# Patient Record
Sex: FEMALE | Race: BLACK OR AFRICAN AMERICAN | NOT HISPANIC OR LATINO | Employment: UNEMPLOYED | URBAN - METROPOLITAN AREA
[De-identification: names, ages, dates, MRNs, and addresses within clinical notes are randomized per-mention and may not be internally consistent; named-entity substitution may affect disease eponyms.]

---

## 2019-06-27 ENCOUNTER — OFFICE VISIT (OUTPATIENT)
Dept: URGENT CARE | Facility: CLINIC | Age: 64
End: 2019-06-27
Payer: OTHER GOVERNMENT

## 2019-06-27 ENCOUNTER — HOSPITAL ENCOUNTER (OUTPATIENT)
Dept: RADIOLOGY | Facility: HOSPITAL | Age: 64
Discharge: HOME | End: 2019-06-27
Attending: NURSE PRACTITIONER
Payer: OTHER GOVERNMENT

## 2019-06-27 VITALS
TEMPERATURE: 96 F | SYSTOLIC BLOOD PRESSURE: 120 MMHG | DIASTOLIC BLOOD PRESSURE: 90 MMHG | WEIGHT: 150.38 LBS | BODY MASS INDEX: 29.36 KG/M2

## 2019-06-27 DIAGNOSIS — M79.671 RIGHT FOOT PAIN: ICD-10-CM

## 2019-06-27 DIAGNOSIS — M25.561 ACUTE PAIN OF RIGHT KNEE: ICD-10-CM

## 2019-06-27 DIAGNOSIS — M25.561 ACUTE PAIN OF RIGHT KNEE: Primary | ICD-10-CM

## 2019-06-27 PROCEDURE — 73630 XR FOOT COMPLETE 3 VIEW RIGHT: ICD-10-PCS | Mod: 26,RT,, | Performed by: RADIOLOGY

## 2019-06-27 PROCEDURE — 99203 OFFICE O/P NEW LOW 30 MIN: CPT | Mod: PBBFAC,25,PO | Performed by: NURSE PRACTITIONER

## 2019-06-27 PROCEDURE — 73560 X-RAY EXAM OF KNEE 1 OR 2: CPT | Mod: 26,59,RT, | Performed by: RADIOLOGY

## 2019-06-27 PROCEDURE — 73562 X-RAY EXAM OF KNEE 3: CPT | Mod: 26,RT,, | Performed by: RADIOLOGY

## 2019-06-27 PROCEDURE — 73562 XR KNEE ORTHO RIGHT: ICD-10-PCS | Mod: 26,RT,, | Performed by: RADIOLOGY

## 2019-06-27 PROCEDURE — 99203 OFFICE O/P NEW LOW 30 MIN: CPT | Mod: S$PBB,,, | Performed by: NURSE PRACTITIONER

## 2019-06-27 PROCEDURE — 73560 X-RAY EXAM OF KNEE 1 OR 2: CPT | Mod: TC,FY,PO,RT

## 2019-06-27 PROCEDURE — 99999 PR PBB SHADOW E&M-NEW PATIENT-LVL III: CPT | Mod: PBBFAC,,, | Performed by: NURSE PRACTITIONER

## 2019-06-27 PROCEDURE — 73630 X-RAY EXAM OF FOOT: CPT | Mod: TC,FY,PO,RT

## 2019-06-27 PROCEDURE — 73560 XR KNEE ORTHO RIGHT: ICD-10-PCS | Mod: 26,59,RT, | Performed by: RADIOLOGY

## 2019-06-27 PROCEDURE — 73562 X-RAY EXAM OF KNEE 3: CPT | Mod: TC,FY,PO,RT

## 2019-06-27 PROCEDURE — 73630 X-RAY EXAM OF FOOT: CPT | Mod: 26,RT,, | Performed by: RADIOLOGY

## 2019-06-27 PROCEDURE — 99203 PR OFFICE/OUTPT VISIT, NEW, LEVL III, 30-44 MIN: ICD-10-PCS | Mod: S$PBB,,, | Performed by: NURSE PRACTITIONER

## 2019-06-27 PROCEDURE — 99999 PR PBB SHADOW E&M-NEW PATIENT-LVL III: ICD-10-PCS | Mod: PBBFAC,,, | Performed by: NURSE PRACTITIONER

## 2019-06-27 RX ORDER — MELOXICAM 7.5 MG/1
7.5 TABLET ORAL DAILY
Qty: 30 TABLET | Refills: 0 | Status: SHIPPED | OUTPATIENT
Start: 2019-06-27 | End: 2019-07-27

## 2019-06-27 NOTE — PROGRESS NOTES
Subjective:      Patient ID: Juliane Ross is a 63 y.o. female.    Chief Complaint: Heel Pain    Knee Pain    The incident occurred 12 to 24 hours ago. The incident occurred at home. The injury mechanism is unknown. The pain is present in the right knee. The quality of the pain is described as aching. The pain has been constant since onset. Pertinent negatives include no inability to bear weight (pain with movement), loss of motion, loss of sensation, muscle weakness, numbness or tingling. The symptoms are aggravated by movement, palpation and weight bearing. She has tried ice, acetaminophen, NSAIDs, rest and elevation for the symptoms. The treatment provided no relief.   Foot Injury    The incident occurred 2 days ago. The incident occurred at home. Injury mechanism: sudden pain while running. The pain is present in the right heel. The quality of the pain is described as aching. Pertinent negatives include no inability to bear weight (pain with movement), loss of motion, loss of sensation, muscle weakness, numbness or tingling.     Review of Systems   Constitutional: Negative for chills and fever.   HENT: Negative.    Respiratory: Negative.  Negative for shortness of breath and wheezing.    Cardiovascular: Negative.    Gastrointestinal: Negative for abdominal pain, constipation, diarrhea, nausea and vomiting.   Genitourinary: Negative.    Musculoskeletal: Negative.  Negative for neck pain and neck stiffness.   Skin: Negative for rash.   Neurological: Negative.  Negative for tingling and numbness.   Psychiatric/Behavioral: Negative.        Objective:   BP (!) 120/90 (BP Location: Right arm, Patient Position: Sitting, BP Method: Small (Manual))   Temp 96 °F (35.6 °C) (Tympanic)   Wt 68.2 kg (150 lb 5.7 oz)   BMI 29.36 kg/m²   Physical Exam   Constitutional: She is oriented to person, place, and time. She appears well-developed and well-nourished. No distress.   HENT:   Head: Normocephalic and atraumatic.    Right Ear: External ear normal.   Left Ear: External ear normal.   Nose: Nose normal.   Eyes: Right eye exhibits no discharge. Left eye exhibits no discharge.   Neck: Normal range of motion. Neck supple.   Cardiovascular: Normal rate.   Pulmonary/Chest: Effort normal. No respiratory distress.   Musculoskeletal: Normal range of motion.        Right knee: She exhibits swelling and bony tenderness (patella). She exhibits normal range of motion, no ecchymosis, no erythema, no LCL laxity and no MCL laxity. Tenderness found. Patellar tendon tenderness noted.        Right foot: There is tenderness and bony tenderness (midfoot and heel). There is normal range of motion, no swelling, normal capillary refill and no deformity.   Neurological: She is alert and oriented to person, place, and time.   Skin: Skin is warm and dry. No rash noted. She is not diaphoretic.   Psychiatric: She has a normal mood and affect. Her behavior is normal. Judgment and thought content normal.   Nursing note and vitals reviewed.    Assessment:      1. Acute pain of right knee    2. Right foot pain       Plan:   Acute pain of right knee  -     X-ray Knee Ortho Right; Future; Expected date: 06/27/2019  -     meloxicam (MOBIC) 7.5 MG tablet; Take 1 tablet (7.5 mg total) by mouth once daily.  Dispense: 30 tablet; Refill: 0  -     POCT Apply ace wrap    Right foot pain  -     X-Ray Foot Complete Right; Future; Expected date: 06/27/2019  -     meloxicam (MOBIC) 7.5 MG tablet; Take 1 tablet (7.5 mg total) by mouth once daily.  Dispense: 30 tablet; Refill: 0  -     POCT Apply ace wrap    RICE  Ortho if not improving.  Instructions, follow up, and supportive care as per AVS.

## 2021-04-28 ENCOUNTER — PATIENT MESSAGE (OUTPATIENT)
Dept: RESEARCH | Facility: HOSPITAL | Age: 66
End: 2021-04-28

## 2023-01-05 ENCOUNTER — PATIENT MESSAGE (OUTPATIENT)
Dept: RESEARCH | Facility: HOSPITAL | Age: 68
End: 2023-01-05
Payer: MEDICARE

## 2023-06-06 ENCOUNTER — TELEPHONE (OUTPATIENT)
Dept: INTERNAL MEDICINE | Facility: CLINIC | Age: 68
End: 2023-06-06
Payer: MEDICARE

## 2023-06-06 NOTE — TELEPHONE ENCOUNTER
----- Message from Maggi Stephen sent at 6/6/2023  3:12 PM CDT -----  Regarding: Appt  Contact: [phone]  Patient Juliane is calling. Patient would like to schedule an appt with . Please call patient at [phone]

## 2023-06-29 ENCOUNTER — OFFICE VISIT (OUTPATIENT)
Dept: INTERNAL MEDICINE | Facility: CLINIC | Age: 68
End: 2023-06-29
Payer: MEDICARE

## 2023-06-29 ENCOUNTER — HOSPITAL ENCOUNTER (OUTPATIENT)
Dept: CARDIOLOGY | Facility: HOSPITAL | Age: 68
Discharge: HOME | End: 2023-06-29
Payer: MEDICARE

## 2023-06-29 VITALS
DIASTOLIC BLOOD PRESSURE: 96 MMHG | TEMPERATURE: 98 F | HEIGHT: 60 IN | WEIGHT: 161.63 LBS | BODY MASS INDEX: 31.73 KG/M2 | SYSTOLIC BLOOD PRESSURE: 160 MMHG | HEART RATE: 84 BPM

## 2023-06-29 DIAGNOSIS — E78.5 HYPERLIPIDEMIA, UNSPECIFIED HYPERLIPIDEMIA TYPE: ICD-10-CM

## 2023-06-29 DIAGNOSIS — R06.02 SOB (SHORTNESS OF BREATH): ICD-10-CM

## 2023-06-29 DIAGNOSIS — H26.9 CATARACT, UNSPECIFIED CATARACT TYPE, UNSPECIFIED LATERALITY: ICD-10-CM

## 2023-06-29 DIAGNOSIS — R73.09 ABNORMAL GLUCOSE: ICD-10-CM

## 2023-06-29 DIAGNOSIS — I34.1 MVP (MITRAL VALVE PROLAPSE): ICD-10-CM

## 2023-06-29 DIAGNOSIS — Z01.818 PREOP EXAMINATION: ICD-10-CM

## 2023-06-29 DIAGNOSIS — I10 HYPERTENSION, UNSPECIFIED TYPE: Primary | ICD-10-CM

## 2023-06-29 PROCEDURE — 99999 PR PBB SHADOW E&M-EST. PATIENT-LVL IV: CPT | Mod: PBBFAC,,, | Performed by: FAMILY MEDICINE

## 2023-06-29 PROCEDURE — 99999 PR PBB SHADOW E&M-EST. PATIENT-LVL IV: ICD-10-PCS | Mod: PBBFAC,,, | Performed by: FAMILY MEDICINE

## 2023-06-29 PROCEDURE — 93005 ELECTROCARDIOGRAM TRACING: CPT | Mod: PO

## 2023-06-29 PROCEDURE — 99204 PR OFFICE/OUTPT VISIT, NEW, LEVL IV, 45-59 MIN: ICD-10-PCS | Mod: S$PBB,,, | Performed by: FAMILY MEDICINE

## 2023-06-29 PROCEDURE — 99214 OFFICE O/P EST MOD 30 MIN: CPT | Mod: PBBFAC,PO | Performed by: FAMILY MEDICINE

## 2023-06-29 PROCEDURE — 93010 ELECTROCARDIOGRAM REPORT: CPT | Mod: ,,, | Performed by: INTERNAL MEDICINE

## 2023-06-29 PROCEDURE — 99204 OFFICE O/P NEW MOD 45 MIN: CPT | Mod: S$PBB,,, | Performed by: FAMILY MEDICINE

## 2023-06-29 PROCEDURE — 93010 EKG 12-LEAD: ICD-10-PCS | Mod: ,,, | Performed by: INTERNAL MEDICINE

## 2023-06-29 RX ORDER — VIT C/E/ZN/COPPR/LUTEIN/ZEAXAN 250MG-90MG
1000 CAPSULE ORAL DAILY
COMMUNITY

## 2023-06-29 RX ORDER — LOSARTAN POTASSIUM 50 MG/1
50 TABLET ORAL DAILY
Qty: 30 TABLET | Refills: 1 | Status: SHIPPED | OUTPATIENT
Start: 2023-06-29 | End: 2023-08-16 | Stop reason: SDUPTHER

## 2023-06-29 RX ORDER — PHENYLEPHRINE HCL 10 MG
500 TABLET ORAL DAILY
COMMUNITY

## 2023-06-29 RX ORDER — ASCORBIC ACID 500 MG
500 TABLET ORAL DAILY
COMMUNITY

## 2023-06-29 NOTE — PROGRESS NOTES
Subjective:      Patient ID: Juliane Ross is a 67 y.o. female.    Chief Complaint: Establish Care and Pre-op Exam    HPI  68 yo pleasant female here to establish care.  Not seen in many yrs.  Having preop for cataracts,  and .  BP elevated off/on.  Trying to eat better.  Was heavier//feeling more SOB and so started moving more.  Feeling better.  Denies current SOB/CP.  Normal BMs    Past Medical History:   Diagnosis Date    Fatigue     Heart murmur     Hyperlipidemia     Sleep apnea      Family History   Problem Relation Age of Onset    Cancer Mother     Hypertension Sister     Arthritis Sister     Arthritis Sister     Asthma Daughter     Diabetes Paternal Aunt     Cancer Paternal Grandmother     Diabetes Paternal Grandfather      Past Surgical History:   Procedure Laterality Date     SECTION      MYOMECTOMY       Social History     Tobacco Use    Smoking status: Former     Packs/day: 1.00     Types: Cigarettes     Quit date: 1988     Years since quittin.0     Passive exposure: Current   Substance Use Topics    Alcohol use: No    Drug use: No       BP (!) 160/96   Pulse 84   Temp 97.9 °F (36.6 °C) (Tympanic)   Ht 5' (1.524 m)   Wt 73.3 kg (161 lb 9.6 oz)   BMI 31.56 kg/m²     Review of Systems   Constitutional:  Positive for activity change. Negative for appetite change, chills, diaphoresis, fatigue, fever and unexpected weight change.   HENT:  Negative for ear pain, hearing loss, postnasal drip, rhinorrhea and tinnitus.    Eyes:  Positive for visual disturbance.   Respiratory:  Negative for cough, shortness of breath and wheezing.    Cardiovascular:  Negative for chest pain, palpitations and leg swelling.   Gastrointestinal:  Negative for abdominal distention.   Genitourinary:  Negative for dysuria, frequency, hematuria and urgency.   Musculoskeletal:  Negative for back pain and joint swelling.   Neurological:  Negative for weakness and headaches.   Hematological:  Negative  for adenopathy.   Psychiatric/Behavioral:  Negative for confusion and decreased concentration.      Objective:     Physical Exam  Vitals and nursing note reviewed.   Constitutional:       General: She is not in acute distress.     Appearance: She is well-developed.   HENT:      Right Ear: External ear normal.      Left Ear: External ear normal.      Nose: Nose normal.   Eyes:      Conjunctiva/sclera: Conjunctivae normal.      Pupils: Pupils are equal, round, and reactive to light.   Neck:      Thyroid: No thyromegaly.   Cardiovascular:      Rate and Rhythm: Normal rate and regular rhythm.      Heart sounds: Murmur heard.   Pulmonary:      Effort: Pulmonary effort is normal. No respiratory distress.      Breath sounds: Normal breath sounds. No wheezing or rales.   Abdominal:      General: Bowel sounds are normal. There is no distension.      Palpations: Abdomen is soft.      Tenderness: There is no abdominal tenderness. There is no guarding.   Musculoskeletal:         General: Normal range of motion.      Cervical back: Normal range of motion and neck supple.   Skin:     General: Skin is warm and dry.      Findings: No rash.   Neurological:      Mental Status: She is alert and oriented to person, place, and time.      Cranial Nerves: No cranial nerve deficit.   Psychiatric:         Behavior: Behavior normal.         Thought Content: Thought content normal.         Judgment: Judgment normal.       Lab Results   Component Value Date    WBC 4.60 11/14/2013    HGB 13.1 11/14/2013    HCT 39.8 11/14/2013     11/14/2013    CHOL 269 (H) 11/14/2013    TRIG 56 11/14/2013    HDL 87 (H) 11/14/2013    ALT 28 11/14/2013    AST 22 11/14/2013     11/14/2013    K 3.9 11/14/2013     11/14/2013    CREATININE 0.9 11/14/2013    BUN 11 11/14/2013    CO2 26 11/14/2013    TSH 1.302 11/14/2013    HGBA1C 6.1 11/14/2013       Assessment:     1. Hypertension, unspecified type    2. Preop examination    3. Cataract,  unspecified cataract type, unspecified laterality    4. MVP (mitral valve prolapse)    5. SOB (shortness of breath)    6. Abnormal glucose    7. Hyperlipidemia, unspecified hyperlipidemia type         Plan:     Hypertension, unspecified type  -     EKG 12-lead  -     CBC Auto Differential; Future; Expected date: 07/29/2023  -     Comprehensive Metabolic Panel; Future; Expected date: 07/29/2023    Preop examination    Cataract, unspecified cataract type, unspecified laterality    MVP (mitral valve prolapse)  -     EKG 12-lead    SOB (shortness of breath)  -     EKG 12-lead    Abnormal glucose  -     Hemoglobin A1C; Future; Expected date: 07/29/2023    Hyperlipidemia, unspecified hyperlipidemia type  -     Lipid Panel; Future; Expected date: 07/29/2023  -     TSH; Future; Expected date: 07/29/2023    Other orders  -     losartan (COZAAR) 50 MG tablet; Take 1 tablet (50 mg total) by mouth once daily.  Dispense: 30 tablet; Refill: 1      BP high  Start losartan 50mg  Low sodium diet  Preop forms filled out//will fax and pt can take a copy  Hx of mild DOMINIC//not treated.  Consider as BP getting controlled  Known MVP  Update labs/EKG and fu 3-4 wks

## 2023-07-24 ENCOUNTER — PATIENT MESSAGE (OUTPATIENT)
Dept: RESEARCH | Facility: HOSPITAL | Age: 68
End: 2023-07-24
Payer: MEDICARE

## 2023-07-31 ENCOUNTER — LAB VISIT (OUTPATIENT)
Dept: LAB | Facility: HOSPITAL | Age: 68
End: 2023-07-31
Attending: FAMILY MEDICINE
Payer: MEDICARE

## 2023-07-31 ENCOUNTER — PATIENT MESSAGE (OUTPATIENT)
Dept: RESEARCH | Facility: HOSPITAL | Age: 68
End: 2023-07-31
Payer: MEDICARE

## 2023-07-31 DIAGNOSIS — R73.09 ABNORMAL GLUCOSE: ICD-10-CM

## 2023-07-31 DIAGNOSIS — I10 HYPERTENSION, UNSPECIFIED TYPE: ICD-10-CM

## 2023-07-31 DIAGNOSIS — E78.5 HYPERLIPIDEMIA, UNSPECIFIED HYPERLIPIDEMIA TYPE: ICD-10-CM

## 2023-07-31 LAB
ALBUMIN SERPL BCP-MCNC: 4.1 G/DL (ref 3.5–5.2)
ALP SERPL-CCNC: 73 U/L (ref 55–135)
ALT SERPL W/O P-5'-P-CCNC: 17 U/L (ref 10–44)
ANION GAP SERPL CALC-SCNC: 14 MMOL/L (ref 8–16)
AST SERPL-CCNC: 20 U/L (ref 10–40)
BASOPHILS # BLD AUTO: 0.02 K/UL (ref 0–0.2)
BASOPHILS NFR BLD: 0.4 % (ref 0–1.9)
BILIRUB SERPL-MCNC: 0.5 MG/DL (ref 0.1–1)
BUN SERPL-MCNC: 10 MG/DL (ref 8–23)
CALCIUM SERPL-MCNC: 9.8 MG/DL (ref 8.7–10.5)
CHLORIDE SERPL-SCNC: 105 MMOL/L (ref 95–110)
CHOLEST SERPL-MCNC: 267 MG/DL (ref 120–199)
CHOLEST/HDLC SERPL: 3.1 {RATIO} (ref 2–5)
CO2 SERPL-SCNC: 23 MMOL/L (ref 23–29)
CREAT SERPL-MCNC: 0.7 MG/DL (ref 0.5–1.4)
DIFFERENTIAL METHOD: ABNORMAL
EOSINOPHIL # BLD AUTO: 0.1 K/UL (ref 0–0.5)
EOSINOPHIL NFR BLD: 1.9 % (ref 0–8)
ERYTHROCYTE [DISTWIDTH] IN BLOOD BY AUTOMATED COUNT: 12.7 % (ref 11.5–14.5)
EST. GFR  (NO RACE VARIABLE): >60 ML/MIN/1.73 M^2
ESTIMATED AVG GLUCOSE: 120 MG/DL (ref 68–131)
GLUCOSE SERPL-MCNC: 85 MG/DL (ref 70–110)
HBA1C MFR BLD: 5.8 % (ref 4–5.6)
HCT VFR BLD AUTO: 41.2 % (ref 37–48.5)
HDLC SERPL-MCNC: 86 MG/DL (ref 40–75)
HDLC SERPL: 32.2 % (ref 20–50)
HGB BLD-MCNC: 13.4 G/DL (ref 12–16)
IMM GRANULOCYTES # BLD AUTO: 0.01 K/UL (ref 0–0.04)
IMM GRANULOCYTES NFR BLD AUTO: 0.2 % (ref 0–0.5)
LDLC SERPL CALC-MCNC: 166.4 MG/DL (ref 63–159)
LYMPHOCYTES # BLD AUTO: 2.8 K/UL (ref 1–4.8)
LYMPHOCYTES NFR BLD: 60.8 % (ref 18–48)
MCH RBC QN AUTO: 30.4 PG (ref 27–31)
MCHC RBC AUTO-ENTMCNC: 32.5 G/DL (ref 32–36)
MCV RBC AUTO: 93 FL (ref 82–98)
MONOCYTES # BLD AUTO: 0.3 K/UL (ref 0.3–1)
MONOCYTES NFR BLD: 7.1 % (ref 4–15)
NEUTROPHILS # BLD AUTO: 1.4 K/UL (ref 1.8–7.7)
NEUTROPHILS NFR BLD: 29.6 % (ref 38–73)
NONHDLC SERPL-MCNC: 181 MG/DL
NRBC BLD-RTO: 0 /100 WBC
PLATELET # BLD AUTO: 280 K/UL (ref 150–450)
PMV BLD AUTO: 9.6 FL (ref 9.2–12.9)
POTASSIUM SERPL-SCNC: 4.1 MMOL/L (ref 3.5–5.1)
PROT SERPL-MCNC: 7.7 G/DL (ref 6–8.4)
RBC # BLD AUTO: 4.41 M/UL (ref 4–5.4)
SODIUM SERPL-SCNC: 142 MMOL/L (ref 136–145)
TRIGL SERPL-MCNC: 73 MG/DL (ref 30–150)
TSH SERPL DL<=0.005 MIU/L-ACNC: 1.7 UIU/ML (ref 0.4–4)
WBC # BLD AUTO: 4.67 K/UL (ref 3.9–12.7)

## 2023-07-31 PROCEDURE — 84443 ASSAY THYROID STIM HORMONE: CPT | Performed by: FAMILY MEDICINE

## 2023-07-31 PROCEDURE — 83036 HEMOGLOBIN GLYCOSYLATED A1C: CPT | Performed by: FAMILY MEDICINE

## 2023-07-31 PROCEDURE — 85025 COMPLETE CBC W/AUTO DIFF WBC: CPT | Performed by: FAMILY MEDICINE

## 2023-07-31 PROCEDURE — 80061 LIPID PANEL: CPT | Performed by: FAMILY MEDICINE

## 2023-07-31 PROCEDURE — 36415 COLL VENOUS BLD VENIPUNCTURE: CPT | Mod: PO | Performed by: FAMILY MEDICINE

## 2023-07-31 PROCEDURE — 80053 COMPREHEN METABOLIC PANEL: CPT | Performed by: FAMILY MEDICINE

## 2023-08-16 RX ORDER — LOSARTAN POTASSIUM 50 MG/1
50 TABLET ORAL DAILY
Qty: 30 TABLET | Refills: 6 | Status: SHIPPED | OUTPATIENT
Start: 2023-08-16 | End: 2024-08-15

## 2023-08-16 NOTE — TELEPHONE ENCOUNTER
No care due was identified.  [facility] Embedded Care Due Messages. Reference number: [id number].   8/16/2023 9:55:16 AM CDT

## 2025-03-28 ENCOUNTER — HOSPITAL ENCOUNTER (OUTPATIENT)
Dept: RADIOLOGY | Facility: CLINIC | Age: 70
Discharge: HOME | End: 2025-03-28
Attending: PHYSICIAN ASSISTANT
Payer: MEDICARE

## 2025-03-28 ENCOUNTER — OFFICE VISIT (OUTPATIENT)
Dept: URGENT CARE | Facility: CLINIC | Age: 70
End: 2025-03-28
Payer: MEDICARE

## 2025-03-28 VITALS
OXYGEN SATURATION: 100 % | SYSTOLIC BLOOD PRESSURE: 189 MMHG | HEART RATE: 95 BPM | WEIGHT: 167.13 LBS | DIASTOLIC BLOOD PRESSURE: 89 MMHG | RESPIRATION RATE: 16 BRPM | TEMPERATURE: 99 F | BODY MASS INDEX: 29.61 KG/M2 | HEIGHT: 63 IN

## 2025-03-28 DIAGNOSIS — R53.83 FATIGUE, UNSPECIFIED TYPE: ICD-10-CM

## 2025-03-28 DIAGNOSIS — Z91.199 NON-COMPLIANT PATIENT: ICD-10-CM

## 2025-03-28 DIAGNOSIS — R05.1 ACUTE COUGH: ICD-10-CM

## 2025-03-28 DIAGNOSIS — I10 ELEVATED BLOOD PRESSURE READING IN OFFICE WITH DIAGNOSIS OF HYPERTENSION: ICD-10-CM

## 2025-03-28 DIAGNOSIS — R05.8 POST-VIRAL COUGH SYNDROME: ICD-10-CM

## 2025-03-28 DIAGNOSIS — Z86.16 HISTORY OF COVID-19: ICD-10-CM

## 2025-03-28 DIAGNOSIS — Z86.16 HISTORY OF COVID-19: Primary | ICD-10-CM

## 2025-03-28 RX ORDER — BENZONATATE 200 MG/1
200 CAPSULE ORAL 3 TIMES DAILY PRN
Qty: 30 CAPSULE | Refills: 0 | Status: SHIPPED | OUTPATIENT
Start: 2025-03-28 | End: 2025-04-07

## 2025-03-28 RX ORDER — FLUTICASONE PROPIONATE 50 MCG
1 SPRAY, SUSPENSION (ML) NASAL DAILY
Qty: 16 ML | Refills: 0 | Status: SHIPPED | OUTPATIENT
Start: 2025-03-28

## 2025-03-28 RX ORDER — PROMETHAZINE HYDROCHLORIDE AND DEXTROMETHORPHAN HYDROBROMIDE 6.25; 15 MG/5ML; MG/5ML
5 SYRUP ORAL EVERY 6 HOURS PRN
Qty: 120 ML | Refills: 0 | Status: SHIPPED | OUTPATIENT
Start: 2025-03-28

## 2025-03-28 NOTE — PROGRESS NOTES
"Subjective:      Patient ID: Juliane Ross is a 69 y.o. female.    Vitals:  height is 5' 3" (1.6 m) and weight is 75.8 kg (167 lb 1.7 oz). Her oral temperature is 98.9 °F (37.2 °C). Her blood pressure is 189/89 (abnormal) and her pulse is 95. Her respiration is 16 and oxygen saturation is 100%.     Chief Complaint: Cough    This is a 69 y.o. female who presents today with a chief complaint of productive cough (chest congestion), fatigue, nasal congestion, scratchy throat ("from coughing") and headache (frontal) x 1.5 weeks after testing positive for COVID at home.  Reports family members also had covid but pt feels her symptoms have not improved as much as her family members. She denies fever, chest pain, sob, ear pain, ear congestion, nausea, vomiting, diarrhea. Reports rattling feeling she describes as wheeze occasionally. No hx of asthma or lung disease. Cough seems worse when laying down, talking, or deep breaths.     Home tx: mucinex (last dose: couple of days ago), robitussin (last dose: yesterday), cough drops: reports mild relief.     [facility]: prolactinoma, mitral valve prolapse, high bp      Cough  This is a new problem. The current episode started 1 to 4 weeks ago. The problem has been gradually improving. The cough is Productive of sputum. Associated symptoms include headaches, nasal congestion, postnasal drip, a sore throat, sweats and wheezing. Pertinent negatives include no chest pain, chills, ear congestion, ear pain, fever, heartburn, hemoptysis, myalgias, rash, shortness of breath or weight loss. She has tried OTC cough suppressant for the symptoms. The treatment provided mild relief. There is no history of asthma, bronchiectasis, bronchitis, COPD, emphysema, environmental allergies or pneumonia.       Constitution: Positive for fatigue. Negative for chills and fever.   HENT:  Positive for congestion, postnasal drip and sore throat. Negative for ear pain.    Neck: neck negative. "   Cardiovascular:  Negative for chest pain, leg swelling, palpitations and sob on exertion.   Respiratory:  Positive for cough, sputum production and wheezing. Negative for bloody sputum, shortness of breath and asthma.    Gastrointestinal:  Negative for abdominal pain, nausea, vomiting and heartburn.   Musculoskeletal:  Negative for muscle ache.   Skin:  Negative for rash.   Allergic/Immunologic: Negative for environmental allergies and asthma.   Neurological:  Positive for headaches. Negative for dizziness, passing out, numbness and tingling.      Objective:     Physical Exam   Constitutional: She is oriented to person, place, and time. She appears well-developed.  Non-toxic appearance. She does not appear ill. No distress. overweight  HENT:   Head: Normocephalic and atraumatic.   Ears:   Right Ear: External ear normal. impacted cerumen  Left Ear: External ear normal. impacted cerumen  Nose: Nose normal.   Mouth/Throat: Mucous membranes are moist. Oropharynx is clear.   Eyes: Conjunctivae and EOM are normal.   Neck: Neck supple.   Cardiovascular: Normal rate, regular rhythm and normal heart sounds.   Pulmonary/Chest: Effort normal and breath sounds normal. No respiratory distress. She has no wheezes.   Abdominal: Normal appearance.   Musculoskeletal: Normal range of motion.         General: Normal range of motion.   Lymphadenopathy:     She has no cervical adenopathy.   Neurological: no focal deficit. She is alert and oriented to person, place, and time. She displays no weakness. No cranial nerve deficit. Gait normal.   Skin: Skin is warm, dry, not diaphoretic, not pale and no rash.   Psychiatric: Her behavior is normal.     XR CHEST PA AND LATERAL  Result Date: 3/28/2025  EXAM: XR CHEST PA AND LATERAL CLINICAL HISTORY:  Partial history of COVID FINDINGS: The heart size is normal. The lung fields are clear.      No acute findings. Finalized on: 3/28/2025 1:40 PM By:  Jonah Moncada MD [facility]# [id number]      2025-03-28  13:42:[id number]     [facility]      Assessment:     1. History of COVID-19    2. Elevated blood pressure reading in office with diagnosis of hypertension    3. Acute cough    4. Fatigue, unspecified type    5. Non-compliant patient    6. Post-viral cough syndrome        Plan:       History of COVID-19  -     XR CHEST PA AND LATERAL; Future; Expected date: 03/28/2025  -     fluticasone propionate (FLONASE) 50 mcg/actuation nasal spray; 1 spray (50 mcg total) by Each Nostril route once daily.  Dispense: 16 mL; Refill: 0    Elevated blood pressure reading in office with diagnosis of hypertension  -     Ambulatory referral/consult to Internal Medicine    Acute cough  -     XR CHEST PA AND LATERAL; Future; Expected date: 03/28/2025  -     benzonatate (TESSALON) 200 MG capsule; Take 1 capsule (200 mg total) by mouth 3 (three) times daily as needed for Cough.  Dispense: 30 capsule; Refill: 0  -     promethazine-dextromethorphan (PROMETHAZINE-DM) 6.25-15 mg/5 mL Syrp; Take 5 mLs by mouth every 6 (six) hours as needed (cough). May cause drowsiness.  Dispense: 120 mL; Refill: 0    Fatigue, unspecified type  -     XR CHEST PA AND LATERAL; Future; Expected date: 03/28/2025    Non-compliant patient  -     Ambulatory referral/consult to Internal Medicine    Post-viral cough syndrome  -     benzonatate (TESSALON) 200 MG capsule; Take 1 capsule (200 mg total) by mouth 3 (three) times daily as needed for Cough.  Dispense: 30 capsule; Refill: 0  -     promethazine-dextromethorphan (PROMETHAZINE-DM) 6.25-15 mg/5 mL Syrp; Take 5 mLs by mouth every 6 (six) hours as needed (cough). May cause drowsiness.  Dispense: 120 mL; Refill: 0  -     fluticasone propionate (FLONASE) 50 mcg/actuation nasal spray; 1 spray (50 mcg total) by Each Nostril route once daily.  Dispense: 16 mL; Refill: 0          Medical Decision Making:   History:   Old Medical Records: I decided to obtain old medical records.       <> Summary of Records: BP markedly elevated upon  arrival.  Per chart review, last appointment with PCP was in 2023 where she establish care and was prescribed losartan.  Patient states that she has not followed up since that appointment and currently does not take any medications for blood pressure or cholesterol.  Differential Diagnosis:   Post viral cough, bronchitis, reactive airway disease, pneumonia  Clinical Tests:   Radiological Study: Ordered and Reviewed  Urgent Care Management:  Chest x-ray with no acute findings.  Lungs are CTA and oxygen 100%.  No evidence of pneumonia or bacterial infection.  Patient was advised to schedule follow-up with PCP to reestablish care and have hypertension and hyperlipidemia evaluated and treated. DASH diet.  Cough medications as prescribed.  Daily Flonase.  Can continue OTC expectorant.  Rest and drink plenty of fluids.  Close follow-up with PCP as discussed, especially if symptoms worsen or fail to improve with treatment.

## 2025-04-11 ENCOUNTER — LAB VISIT (OUTPATIENT)
Dept: LAB | Facility: HOSPITAL | Age: 70
End: 2025-04-11
Payer: MEDICARE

## 2025-04-11 ENCOUNTER — OFFICE VISIT (OUTPATIENT)
Dept: INTERNAL MEDICINE | Facility: CLINIC | Age: 70
End: 2025-04-11
Payer: MEDICARE

## 2025-04-11 VITALS
SYSTOLIC BLOOD PRESSURE: 188 MMHG | WEIGHT: 168 LBS | RESPIRATION RATE: 16 BRPM | HEART RATE: 89 BPM | OXYGEN SATURATION: 99 % | TEMPERATURE: 97 F | DIASTOLIC BLOOD PRESSURE: 88 MMHG | BODY MASS INDEX: 29.77 KG/M2 | HEIGHT: 63 IN

## 2025-04-11 DIAGNOSIS — Z78.0 POSTMENOPAUSAL: ICD-10-CM

## 2025-04-11 DIAGNOSIS — E78.5 HYPERLIPIDEMIA, UNSPECIFIED HYPERLIPIDEMIA TYPE: ICD-10-CM

## 2025-04-11 DIAGNOSIS — Z12.11 COLON CANCER SCREENING: ICD-10-CM

## 2025-04-11 DIAGNOSIS — Z12.31 ENCOUNTER FOR SCREENING MAMMOGRAM FOR MALIGNANT NEOPLASM OF BREAST: ICD-10-CM

## 2025-04-11 DIAGNOSIS — Z13.820 OSTEOPOROSIS SCREENING: ICD-10-CM

## 2025-04-11 DIAGNOSIS — G47.9 TROUBLE IN SLEEPING: ICD-10-CM

## 2025-04-11 DIAGNOSIS — I10 HYPERTENSION, UNSPECIFIED TYPE: Primary | ICD-10-CM

## 2025-04-11 DIAGNOSIS — R73.09 ABNORMAL GLUCOSE: ICD-10-CM

## 2025-04-11 DIAGNOSIS — L30.9 DERMATITIS OF EXTERNAL EAR: ICD-10-CM

## 2025-04-11 DIAGNOSIS — I10 HYPERTENSION, UNSPECIFIED TYPE: ICD-10-CM

## 2025-04-11 PROCEDURE — 99215 OFFICE O/P EST HI 40 MIN: CPT | Mod: PBBFAC,PO

## 2025-04-11 PROCEDURE — 80061 LIPID PANEL: CPT

## 2025-04-11 PROCEDURE — 83036 HEMOGLOBIN GLYCOSYLATED A1C: CPT

## 2025-04-11 PROCEDURE — 84443 ASSAY THYROID STIM HORMONE: CPT

## 2025-04-11 PROCEDURE — 99999 PR PBB SHADOW E&M-EST. PATIENT-LVL V: CPT | Mod: PBBFAC,,,

## 2025-04-11 PROCEDURE — 80053 COMPREHEN METABOLIC PANEL: CPT

## 2025-04-11 PROCEDURE — 36415 COLL VENOUS BLD VENIPUNCTURE: CPT | Mod: PO

## 2025-04-11 PROCEDURE — 85025 COMPLETE CBC W/AUTO DIFF WBC: CPT

## 2025-04-11 RX ORDER — LOSARTAN POTASSIUM 50 MG/1
50 TABLET ORAL DAILY
Qty: 90 TABLET | Refills: 0 | Status: SHIPPED | OUTPATIENT
Start: 2025-04-11 | End: 2025-07-10

## 2025-04-11 RX ORDER — LOSARTAN POTASSIUM 50 MG/1
50 TABLET ORAL DAILY
COMMUNITY
End: 2025-04-11 | Stop reason: SDUPTHER

## 2025-04-11 RX ORDER — TRAZODONE HYDROCHLORIDE 50 MG/1
50 TABLET ORAL NIGHTLY
Qty: 30 TABLET | Refills: 11 | Status: SHIPPED | OUTPATIENT
Start: 2025-04-11 | End: 2026-04-11

## 2025-04-11 RX ORDER — TRIAMCINOLONE ACETONIDE 1 MG/G
OINTMENT TOPICAL DAILY PRN
Qty: 15 G | Refills: 0 | Status: SHIPPED | OUTPATIENT
Start: 2025-04-11 | End: 2025-05-11

## 2025-04-11 NOTE — PROGRESS NOTES
Subjective:       Patient ID: Juliane Ross is a 69 y.o. female.    Chief Complaint: Annual Exam (+ Hypertension)    69-year-old female presents for annual exam and to establish care with new provider. Previously managed by Dr. Benavidez.    Postmenopausal                                                                                                                                                                       Not currently sexually active, no concerns for STIs                                                                                                      Last mammogram 2013-will order today                                                                                                                                                    Denies breast lumps, bumps, nipple discharge, change in contour                                                                               Last colon cancer screening 3/2013, will order today                                                                                                                       Last DXA never-will order today  Denies increased urinary urgency, endorses frequency                                                                                                                                        Denies diarrhea, constipation                                                                                                                                                                                Last eye exam within the last year, wears reading glasses  Last dental exam over 6mo ago, no dental disease, brushes teeth BID  Wears her seatbelt in the car  Tries to eat a well balanced diet  Does not sleep well most nights-trouble staying asleep  Exercises on stationary bike 5-6D/wk, intermittent light weights                                                                                                                                                  Retired   Denies chest pain, palpitations, dyspnea, edema, changes in vision, flashes, floaters, tinnitus, myalgia, joint pain, numbness and tingling, weakness, syncope.  Endorses stress and feelings of anxiety due to home situation, denies depression    History of Present Illness    HPI:  Patient presents for an annual visit and follow-up on high blood pressure, with concerns about medication refills and various health screenings. Patient has a history of high blood pressure previously treated with Losartan, but has been without blood pressure medication for about a year. She had COVID-19 approximately 2 weeks ago, during which her blood pressure was measured at 200. She reports recent headaches, atypical for her but not severe, and acknowledges inadequate hydration, especially since her COVID-19 illness. She has sleep disturbances due to frequent household disruptions throughout the night. She takes melatonin as needed to help with sleep initiation but still struggles with maintaining sleep. She has attempted to increase evening physical activity to improve sleep. She reports recent urinary frequency. She reports anxiety related to her living situation, mentioning stress eating and headaches as a result. She has mitral valve prolapse but states she does not have issues like fatigue from it.    HTN:  /88, rechecked at 178/100.  Pt asymptomatic-denies chest pain, palpitations, edema, dyspnea, headache, visual disturbance.  Will refill losartan.    FAMILY HISTORY:  Family history is significant for high blood pressure, breast cancer, and diabetes in older family members. Her sisters have had thyroid removal, and her  is suspected of having thyroid cancer. Patient's aunt had thyroid cancer, and her grandfather had cancer.    SURGICAL HISTORY:  Patient underwent cataract surgery, though the date is not specified.    TEST RESULTS:  Patient underwent a hearing test, date not specified,  "which revealed slight hearing loss in one ear, though she does not remember which. Her last colonoscopy included a biopsy, and the results were negative.          BP (!) 188/88 (BP Location: Right arm, Patient Position: Sitting)   Pulse 89   Temp 97.1 °F (36.2 °C) (Tympanic)   Resp 16   Ht 5' 3" (1.6 m)   Wt 76.2 kg (167 lb 15.9 oz)   SpO2 99%   BMI 29.76 kg/m²     Review of Systems   Constitutional:  Negative for chills and fever.   Eyes:  Negative for visual disturbance.   Respiratory:  Negative for chest tightness and shortness of breath.    Cardiovascular:  Negative for chest pain, palpitations and leg swelling.   Gastrointestinal:  Negative for constipation, diarrhea, nausea and vomiting.   Genitourinary:  Negative for difficulty urinating.   Neurological:  Negative for headaches.   All other systems reviewed and are negative.      Objective:      Physical Exam  Vitals reviewed.   Constitutional:       General: She is not in acute distress.     Appearance: Normal appearance. She is obese. She is not ill-appearing or toxic-appearing.   HENT:      Head: Normocephalic and atraumatic.      Right Ear: External ear normal. There is impacted cerumen.      Left Ear: External ear normal. There is impacted cerumen.      Nose: Nose normal.      Mouth/Throat:      Mouth: Mucous membranes are moist.      Pharynx: Oropharynx is clear.   Eyes:      Extraocular Movements: Extraocular movements intact.      Conjunctiva/sclera: Conjunctivae normal.      Pupils: Pupils are equal, round, and reactive to light.   Cardiovascular:      Rate and Rhythm: Normal rate and regular rhythm.      Pulses:           Radial pulses are 2+ on the right side and 2+ on the left side.   Pulmonary:      Effort: Pulmonary effort is normal.      Breath sounds: Normal breath sounds.   Abdominal:      General: Bowel sounds are normal.      Palpations: Abdomen is soft.   Musculoskeletal:         General: Normal range of motion.      Cervical back: " Normal range of motion and neck supple.   Skin:     General: Skin is warm and dry.   Neurological:      General: No focal deficit present.      Mental Status: She is alert and oriented to person, place, and time.   Psychiatric:         Mood and Affect: Mood normal.         Behavior: Behavior normal.         Thought Content: Thought content normal.         Judgment: Judgment normal.         Assessment:       1. Hypertension, unspecified type    2. Dermatitis of external ear    3. Abnormal glucose    4. Hyperlipidemia, unspecified hyperlipidemia type    5. Encounter for screening mammogram for malignant neoplasm of breast    6. Colon cancer screening    7. Osteoporosis screening    8. Postmenopausal    9. Trouble in sleeping        Plan:     IMPRESSION:  - Assessed HTN, noting medication non-adherence for approximately 1 year.  - Evaluated recent COVID-19 infection and its potential impact on current symptoms.  - Considered thyroid function testing due to family history of thyroid issues.  - Evaluated urinary frequency, considering potential link to glucose levels.    Hypertension, unspecified type  -     losartan (COZAAR) 50 MG tablet; Take 1 tablet (50 mg total) by mouth once daily.  -     CBC Auto Differential; Future  -     Comprehensive Metabolic Panel; Future  -     TSH; Future  - Patient has a history of high blood pressure with recent medication lapse (Losartan) for about a year.  - Recent blood pressure of 200 during COVID infection and current high blood pressure noted.  - No chest pain, palpitations, shortness of breath, or significant swelling reported.  - Discussed salt intake and hydration habits.  - Ordered comprehensive labs including CBC, electrolytes, liver, kidney, cholesterol, and glucose tests.  - Restarted Losartan for hypertension management.  - Instructed patient to monitor blood pressure at home using an arm cuff about 1 hour after taking medication for the next 2 weeks.  - Explained  importance of consistent medication use and proper hydration for overall health.  - Family history of hypertension noted.    Dermatitis of external ear  -     triamcinolone acetonide 0.1% (KENALOG) 0.1 % ointment; Apply topically daily as needed (Dermatitis).    Abnormal glucose  -     Hemoglobin A1C; Future  Prediabetic on last labs.    Hyperlipidemia, unspecified hyperlipidemia type  -     CBC Auto Differential; Future  -     Comprehensive Metabolic Panel; Future  -     TSH; Future  -     Lipid Panel; Future  Last labs showed total 267,   Not currently on medication.    Encounter for screening mammogram for malignant neoplasm of breast  -     Mammo Digital Screening Bilat w/ Charbel (XPD); Future    Colon cancer screening  -     Ambulatory referral/consult to Endo Procedure ; Future    Osteoporosis screening  -     DXA Bone Density Axial Skeleton 1 or more sites; Future    Postmenopausal  -     DXA Bone Density Axial Skeleton 1 or more sites; Future    Trouble in sleeping  -     traZODone (DESYREL) 50 MG tablet; Take 1 tablet (50 mg total) by mouth every evening.  - Patient reported trouble sleeping due to interrupted sleep from household disturbances.  - Prescribed trazodone 50 mg at bedtime for sleep, with potential to increase based on response.    FOLLOW-UP:  - Scheduled follow-up visit in 2 weeks to reassess blood pressure control, review lab results, and assess effectiveness of sleep medication.       Follow up in about 2 weeks (around 4/25/2025), or if symptoms worsen or fail to improve, for BP check, trazadone.

## 2025-04-12 LAB
ABSOLUTE EOSINOPHIL (OHS): 0.04 K/UL
ABSOLUTE MONOCYTE (OHS): 0.34 K/UL (ref 0.3–1)
ABSOLUTE NEUTROPHIL COUNT (OHS): 1.63 K/UL (ref 1.8–7.7)
ALBUMIN SERPL BCP-MCNC: 4.1 G/DL (ref 3.5–5.2)
ALP SERPL-CCNC: 74 UNIT/L (ref 40–150)
ALT SERPL W/O P-5'-P-CCNC: 15 UNIT/L (ref 10–44)
ANION GAP (OHS): 8 MMOL/L (ref 8–16)
AST SERPL-CCNC: 19 UNIT/L (ref 11–45)
BASOPHILS # BLD AUTO: 0.02 K/UL
BASOPHILS NFR BLD AUTO: 0.5 %
BILIRUB SERPL-MCNC: 0.5 MG/DL (ref 0.1–1)
BUN SERPL-MCNC: 9 MG/DL (ref 8–23)
CALCIUM SERPL-MCNC: 9.4 MG/DL (ref 8.7–10.5)
CHLORIDE SERPL-SCNC: 103 MMOL/L (ref 95–110)
CHOLEST SERPL-MCNC: 273 MG/DL (ref 120–199)
CHOLEST/HDLC SERPL: 3.3 {RATIO} (ref 2–5)
CO2 SERPL-SCNC: 28 MMOL/L (ref 23–29)
CREAT SERPL-MCNC: 0.9 MG/DL (ref 0.5–1.4)
EAG (OHS): 120 MG/DL (ref 68–131)
ERYTHROCYTE [DISTWIDTH] IN BLOOD BY AUTOMATED COUNT: 13.3 % (ref 11.5–14.5)
GFR SERPLBLD CREATININE-BSD FMLA CKD-EPI: >60 ML/MIN/1.73/M2
GLUCOSE SERPL-MCNC: 99 MG/DL (ref 70–110)
HBA1C MFR BLD: 5.8 % (ref 4–5.6)
HCT VFR BLD AUTO: 40.8 % (ref 37–48.5)
HDLC SERPL-MCNC: 83 MG/DL (ref 40–75)
HDLC SERPL: 30.4 % (ref 20–50)
HGB BLD-MCNC: 12.8 GM/DL (ref 12–16)
IMM GRANULOCYTES # BLD AUTO: 0.01 K/UL (ref 0–0.04)
IMM GRANULOCYTES NFR BLD AUTO: 0.3 % (ref 0–0.5)
LDLC SERPL CALC-MCNC: 176 MG/DL (ref 63–159)
LYMPHOCYTES # BLD AUTO: 1.92 K/UL (ref 1–4.8)
MCH RBC QN AUTO: 30.9 PG (ref 27–31)
MCHC RBC AUTO-ENTMCNC: 31.4 G/DL (ref 32–36)
MCV RBC AUTO: 99 FL (ref 82–98)
NONHDLC SERPL-MCNC: 190 MG/DL
NUCLEATED RBC (/100WBC) (OHS): 0 /100 WBC
PLATELET # BLD AUTO: 284 K/UL (ref 150–450)
PMV BLD AUTO: 9.5 FL (ref 9.2–12.9)
POTASSIUM SERPL-SCNC: 4.1 MMOL/L (ref 3.5–5.1)
PROT SERPL-MCNC: 7.5 GM/DL (ref 6–8.4)
RBC # BLD AUTO: 4.14 M/UL (ref 4–5.4)
RELATIVE EOSINOPHIL (OHS): 1 %
RELATIVE LYMPHOCYTE (OHS): 48.5 % (ref 18–48)
RELATIVE MONOCYTE (OHS): 8.6 % (ref 4–15)
RELATIVE NEUTROPHIL (OHS): 41.1 % (ref 38–73)
SODIUM SERPL-SCNC: 139 MMOL/L (ref 136–145)
TRIGL SERPL-MCNC: 70 MG/DL (ref 30–150)
TSH SERPL-ACNC: 1.24 UIU/ML (ref 0.4–4)
WBC # BLD AUTO: 3.96 K/UL (ref 3.9–12.7)

## 2025-04-14 ENCOUNTER — RESULTS FOLLOW-UP (OUTPATIENT)
Dept: INTERNAL MEDICINE | Facility: CLINIC | Age: 70
End: 2025-04-14

## 2025-04-16 ENCOUNTER — HOSPITAL ENCOUNTER (OUTPATIENT)
Dept: RADIOLOGY | Facility: HOSPITAL | Age: 70
Discharge: HOME | End: 2025-04-16
Payer: MEDICARE

## 2025-04-16 VITALS — BODY MASS INDEX: 29.69 KG/M2 | WEIGHT: 167.56 LBS | HEIGHT: 63 IN

## 2025-04-16 DIAGNOSIS — Z12.31 ENCOUNTER FOR SCREENING MAMMOGRAM FOR MALIGNANT NEOPLASM OF BREAST: ICD-10-CM

## 2025-04-16 PROCEDURE — 77067 SCR MAMMO BI INCL CAD: CPT | Mod: 26,,, | Performed by: RADIOLOGY

## 2025-04-16 PROCEDURE — 77067 SCR MAMMO BI INCL CAD: CPT | Mod: TC

## 2025-04-16 PROCEDURE — 77063 BREAST TOMOSYNTHESIS BI: CPT | Mod: 26,,, | Performed by: RADIOLOGY

## 2025-04-17 ENCOUNTER — TELEPHONE (OUTPATIENT)
Dept: RADIOLOGY | Facility: HOSPITAL | Age: 70
End: 2025-04-17
Payer: MEDICARE

## 2025-04-17 ENCOUNTER — HOSPITAL ENCOUNTER (OUTPATIENT)
Dept: PREADMISSION TESTING | Facility: HOSPITAL | Age: 70
Discharge: HOME | End: 2025-04-17
Attending: COLON & RECTAL SURGERY
Payer: MEDICARE

## 2025-04-17 DIAGNOSIS — Z12.11 COLON CANCER SCREENING: Primary | ICD-10-CM

## 2025-04-17 RX ORDER — POLYETHYLENE GLYCOL 3350, SODIUM SULFATE, POTASSIUM CHLORIDE, MAGNESIUM SULFATE, AND SODIUM CHLORIDE FOR ORAL SOLUTION 178.7-7.3G
1 KIT ORAL DAILY
Qty: 2 EACH | Refills: 0 | Status: SHIPPED | OUTPATIENT
Start: 2025-04-17 | End: 2025-04-19

## 2025-04-23 ENCOUNTER — HOSPITAL ENCOUNTER (OUTPATIENT)
Dept: RADIOLOGY | Facility: HOSPITAL | Age: 70
Discharge: HOME | End: 2025-04-23
Payer: MEDICARE

## 2025-04-23 ENCOUNTER — RESULTS FOLLOW-UP (OUTPATIENT)
Dept: INTERNAL MEDICINE | Facility: CLINIC | Age: 70
End: 2025-04-23

## 2025-04-23 DIAGNOSIS — R92.8 ABNORMAL MAMMOGRAM: ICD-10-CM

## 2025-04-23 DIAGNOSIS — R92.8 ABNORMAL MAMMOGRAM: Primary | ICD-10-CM

## 2025-04-23 PROCEDURE — 76642 ULTRASOUND BREAST LIMITED: CPT | Mod: TC,RT

## 2025-04-23 PROCEDURE — 77065 DX MAMMO INCL CAD UNI: CPT | Mod: 26,RT,, | Performed by: STUDENT IN AN ORGANIZED HEALTH CARE EDUCATION/TRAINING PROGRAM

## 2025-04-23 PROCEDURE — 77061 BREAST TOMOSYNTHESIS UNI: CPT | Mod: 26,RT,, | Performed by: STUDENT IN AN ORGANIZED HEALTH CARE EDUCATION/TRAINING PROGRAM

## 2025-04-23 PROCEDURE — 77065 DX MAMMO INCL CAD UNI: CPT | Mod: TC,RT

## 2025-04-23 PROCEDURE — 76642 ULTRASOUND BREAST LIMITED: CPT | Mod: 26,RT,, | Performed by: STUDENT IN AN ORGANIZED HEALTH CARE EDUCATION/TRAINING PROGRAM

## 2025-04-25 ENCOUNTER — OFFICE VISIT (OUTPATIENT)
Dept: INTERNAL MEDICINE | Facility: CLINIC | Age: 70
End: 2025-04-25
Payer: MEDICARE

## 2025-04-25 VITALS
RESPIRATION RATE: 16 BRPM | DIASTOLIC BLOOD PRESSURE: 78 MMHG | HEART RATE: 112 BPM | SYSTOLIC BLOOD PRESSURE: 110 MMHG | OXYGEN SATURATION: 99 % | BODY MASS INDEX: 29.38 KG/M2 | HEIGHT: 63 IN | TEMPERATURE: 98 F | WEIGHT: 165.81 LBS

## 2025-04-25 DIAGNOSIS — D44.3 NEOPLASM OF UNCERTAIN BEHAVIOR OF PITUITARY GLAND: ICD-10-CM

## 2025-04-25 DIAGNOSIS — I10 HYPERTENSION, UNSPECIFIED TYPE: Primary | ICD-10-CM

## 2025-04-25 DIAGNOSIS — L30.4 PRURITIC INTERTRIGO: ICD-10-CM

## 2025-04-25 DIAGNOSIS — E66.3 OVERWEIGHT (BMI 25.0-29.9): ICD-10-CM

## 2025-04-25 DIAGNOSIS — E78.5 HYPERLIPIDEMIA, UNSPECIFIED HYPERLIPIDEMIA TYPE: ICD-10-CM

## 2025-04-25 PROCEDURE — 99215 OFFICE O/P EST HI 40 MIN: CPT | Mod: PBBFAC,PO

## 2025-04-25 PROCEDURE — 99999 PR PBB SHADOW E&M-EST. PATIENT-LVL V: CPT | Mod: PBBFAC,,,

## 2025-04-25 RX ORDER — LOSARTAN POTASSIUM 50 MG/1
50 TABLET ORAL DAILY
Qty: 90 TABLET | Refills: 2 | Status: SHIPPED | OUTPATIENT
Start: 2025-04-25 | End: 2026-01-20

## 2025-04-25 RX ORDER — ROSUVASTATIN CALCIUM 5 MG/1
5 TABLET, COATED ORAL NIGHTLY
Qty: 90 TABLET | Refills: 3 | Status: SHIPPED | OUTPATIENT
Start: 2025-04-25

## 2025-04-25 RX ORDER — NYSTATIN 100000 U/G
CREAM TOPICAL 3 TIMES DAILY
Qty: 30 G | Refills: 0 | Status: SHIPPED | OUTPATIENT
Start: 2025-04-25

## 2025-04-25 RX ORDER — NYSTATIN 100000 [USP'U]/G
POWDER TOPICAL 4 TIMES DAILY
Qty: 60 G | Refills: 0 | Status: SHIPPED | OUTPATIENT
Start: 2025-04-25

## 2025-04-25 NOTE — PROGRESS NOTES
Subjective:       Patient ID: Juliane Ross is a 69 y.o. female.    Chief Complaint: Hypertension (2W F/U)    History of Present Illness    CHIEF COMPLAINT:  Patient presents today for follow-up on blood pressure and other health concerns    HYPERTENSION:  She restarted losartan about 2 weeks ago after being off for a year and reports adherence to lifestyle modifications including increased hydration and decreased salt intake. She denies chest pain, palpitations, shortness of breath, lower extremity swelling, severe headaches, and vision changes.  Her home Bps average 120's/740's-80's.  One isolated high reading of 149/79.    BREAST HEALTH:  She recently underwent mammogram and has a biopsy scheduled for Tuesday. She expresses concerns regarding potential cancer diagnosis, specifically about T3 staging, mastectomy, and survival outcomes.    SLEEP:  She discontinued trazodone due to significant morning grogginess affecting her daily functioning. She subsequently started melatonin with some improvement, however continues to experience sleep disturbances described as frequent awakening.    HYPERLIPIDEMIA:  She has history of statin intolerance from around , reporting feeling unwell which led to discontinuation. Specific statin and side effects are not recalled.  Her most recent     DERMATOLOGIC:  She reports yeast-like symptoms affecting arms and body folds with itching, redness, and skin cracking, particularly in ear area. She has tried coconut oil with some improvement but notes concerns about clothing stains.    WEIGHT MANAGEMENT:  She reports difficulty with weight loss efforts and navigating conflicting dietary advice. She acknowledges that significant weight reduction would help improve other health conditions.    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 Surgical history includes  and fibroid removal. Family history significant for heart disease.     "      /78 (BP Location: Right arm, Patient Position: Sitting)   Pulse (!) 112   Temp 98.2 °F (36.8 °C) (Tympanic)   Resp 16   Ht 5' 3" (1.6 m)   Wt 75.2 kg (165 lb 12.6 oz)   SpO2 99%   BMI 29.37 kg/m²     Review of Systems   Constitutional:  Negative for chills and fever.   Eyes:  Negative for visual disturbance.   Respiratory:  Negative for chest tightness and shortness of breath.    Cardiovascular:  Negative for chest pain, palpitations and leg swelling.   Gastrointestinal:  Negative for constipation, diarrhea, nausea and vomiting.   Genitourinary:  Negative for difficulty urinating.   Skin:  Positive for rash.   Neurological:  Negative for headaches.   Psychiatric/Behavioral:  Positive for sleep disturbance.    All other systems reviewed and are negative.      Objective:      Physical Exam  Vitals reviewed.   Constitutional:       General: She is not in acute distress.     Appearance: Normal appearance. She is not ill-appearing or toxic-appearing.   HENT:      Head: Normocephalic and atraumatic.   Eyes:      Pupils: Pupils are equal, round, and reactive to light.   Neck:      Vascular: No carotid bruit.   Cardiovascular:      Rate and Rhythm: Normal rate and regular rhythm.   Pulmonary:      Effort: Pulmonary effort is normal.      Breath sounds: Normal breath sounds.   Abdominal:      General: Bowel sounds are normal.      Palpations: Abdomen is soft.   Musculoskeletal:         General: Normal range of motion.      Cervical back: Normal range of motion and neck supple.   Skin:     General: Skin is warm and dry.             Comments: Candidias rash under breasts and pannus and in axilla folds     Neurological:      General: No focal deficit present.      Mental Status: She is alert and oriented to person, place, and time.   Psychiatric:         Mood and Affect: Mood normal.         Behavior: Behavior normal.         Thought Content: Thought content normal.         Judgment: Judgment normal.       "   Assessment:       1. Hypertension, unspecified type    2. Hyperlipidemia, unspecified hyperlipidemia type    3. Cutaneous candidiasis    4. Overweight (BMI 25.0-29.9)    5. 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    Diagnoses and all orders for this visit:    Hypertension, unspecified type  -     losartan (COZAAR) 50 MG tablet; Take 1 tablet (50 mg total) by mouth once daily.    Hyperlipidemia, unspecified hyperlipidemia type  -     rosuvastatin (CRESTOR) 5 MG tablet; Take 1 tablet (5 mg total) by mouth every evening.  -     Lipid Panel; Future    Pruritic intertrigo  -     nystatin (MYCOSTATIN) cream; Apply topically 3 (three) times daily.  -     nystatin (MYCOSTATIN) powder; Apply topically 4 (four) times daily.    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  - Sleep issues acknowledged, discontinued trazodone due to morning grogginess.  - Considered statin therapy for cholesterol management, weighing previous experience and family history of heart disease.  - Suspect intertrigo.  - Discussed weight management strategies and potential nutritional guidance.  - Clarified that the pituitary tumor (prolactinoma) has been stable, not been checked in years.    NEOPLASM OF UNCERTAIN BEHAVIOR OF PITUITARY GLAND:  - Confirmed the presence of a pituitary tumor (prolactinoma) that has not been checked in years.  - Stable at this time  - Acknowledged the need to revisit this diagnosis annually as part of ongoing care.    HYPERTENSION:  - Blood pressure readings were monitored and evaluated, with the lowest being 120/70-80s and one high reading noted.  - Overall, readings were good, and no medication adjustment was needed.  - Continued losartan prescription.  - Advised less  frequent blood pressure checks at home.  - Counseled on lifestyle modifications including sodium restriction, increased physical activity, and weight reduction.    HYPERLIPIDEMIA:  - Noted elevated cholesterol levels.  - Prescribed Rosuvastatin 5 mg daily for management.  - Addressed patient's concerns about CoQ10 depletion with statin use, explaining that while statins can decrease CoQ10, supplementation is not universally recommended but may alleviate side effects.  - Ordered lipid panel in 3 months.  - Instructed to discontinue and contact the office if experiencing adverse effects.  - Recommend lifestyle modifications including reducing saturated fat and fried food intake, increasing physical activity, and weight reduction to improve cholesterol levels and blood pressure.    INSOMNIA:  - Patient discontinued trazodone due to grogginess and ineffectiveness after initial use.  - Sleep has improved slightly with change in sleeping location and use of OTC melatonin.  - Advised to continue monitoring sleep quality and report any changes or concerns.    INTERTRIGO:  - Diagnosed intertrigo based on reported pruritus, yeast-like symptoms on arms and in skin folds, including erythematous rash.  - Prescribed topical nystatin cream for axillary skin folds and nystatin powder for under breasts and pannus, both to be applied 3 times daily.  - Instructed on proper application techniques and hygiene measures.t.  - Considered referral to dermatology if initial treatment is ineffective.    OVERWEIGHT:  - Referred to Lifestyle and Wellness program for nutritional counseling and weight management support.    OSTEOPENIA:  - Explained that calcium and vitamin D supplements help preserve current bone density and slow further loss, potentially preventing fractures in the long term.    FOLLOW-UP:  - Scheduled follow up in 3 months for reassessment of overall health status and cholesterol levels.       Follow up in about 3 months  (around 7/25/2025), or if symptoms worsen or fail to improve, for est care with dr silva.

## 2025-04-29 ENCOUNTER — HOSPITAL ENCOUNTER (OUTPATIENT)
Dept: RADIOLOGY | Facility: HOSPITAL | Age: 70
Discharge: HOME | End: 2025-04-29
Payer: MEDICARE

## 2025-04-29 DIAGNOSIS — R92.8 ABNORMAL MAMMOGRAM: ICD-10-CM

## 2025-04-29 PROCEDURE — 19083 BX BREAST 1ST LESION US IMAG: CPT | Mod: RT,,, | Performed by: STUDENT IN AN ORGANIZED HEALTH CARE EDUCATION/TRAINING PROGRAM

## 2025-04-29 PROCEDURE — 77065 DX MAMMO INCL CAD UNI: CPT | Mod: TC,RT

## 2025-04-29 PROCEDURE — 19083 BX BREAST 1ST LESION US IMAG: CPT | Mod: RT

## 2025-04-29 PROCEDURE — A4648 IMPLANTABLE TISSUE MARKER: HCPCS

## 2025-04-29 PROCEDURE — 88305 TISSUE EXAM BY PATHOLOGIST: CPT | Mod: TC,91 | Performed by: STUDENT IN AN ORGANIZED HEALTH CARE EDUCATION/TRAINING PROGRAM

## 2025-04-29 PROCEDURE — 77065 DX MAMMO INCL CAD UNI: CPT | Mod: 26,RT,, | Performed by: STUDENT IN AN ORGANIZED HEALTH CARE EDUCATION/TRAINING PROGRAM

## 2025-04-29 NOTE — NURSING
Pressure held on right breast  and right axilla biopsy site for 10 mins, hemostasis was achieved, steri strips were applied, and wound was covered with 4x4 guaze and a tegaderm.  Dressing clean, dry and intact with no drainage noted.  Discharge instructions given verbally and in writing, patient voiced understandings.  Patient discharged and accompanied by family member.

## 2025-05-01 LAB
DHEA SERPL-MCNC: NORMAL
ESTROGEN SERPL-MCNC: NORMAL PG/ML
INSULIN SERPL-ACNC: NORMAL U[IU]/ML
LAB AP CLINICAL INFORMATION: NORMAL
LAB AP GROSS DESCRIPTION: NORMAL
LAB AP PERFORMING LOCATION(S): NORMAL
LAB AP REPORT FOOTNOTES: NORMAL

## 2025-05-05 ENCOUNTER — TELEPHONE (OUTPATIENT)
Dept: SURGICAL ONCOLOGY | Facility: CLINIC | Age: 70
End: 2025-05-05
Payer: MEDICARE

## 2025-05-05 ENCOUNTER — RESULTS FOLLOW-UP (OUTPATIENT)
Dept: RADIOLOGY | Facility: HOSPITAL | Age: 70
End: 2025-05-05

## 2025-05-05 DIAGNOSIS — D24.1 FIBROADENOMA OF RIGHT BREAST: Primary | ICD-10-CM

## 2025-05-05 NOTE — TELEPHONE ENCOUNTER
Called patient to discuss benign breast biopsy results- we reviewed the pathology report. Pt verbalized understanding of all information. Pt states everything is healing well at this time and denies any further needs. Patient scheduled for repeat imaging in 6 months with f/u with breast provider.

## 2025-05-23 ENCOUNTER — HOSPITAL ENCOUNTER (OUTPATIENT)
Dept: PREADMISSION TESTING | Facility: HOSPITAL | Age: 70
Discharge: HOME | End: 2025-05-23
Attending: FAMILY MEDICINE
Payer: MEDICARE

## 2025-06-17 ENCOUNTER — ANESTHESIA EVENT (OUTPATIENT)
Dept: ENDOSCOPY | Facility: HOSPITAL | Age: 70
End: 2025-06-17
Payer: MEDICARE

## 2025-06-17 NOTE — ANESTHESIA PREPROCEDURE EVALUATION
2025  Juliane Ross is a 69 y.o., female.    Past Surgical History:   Procedure Laterality Date    CATARACT EXTRACTION Bilateral      SECTION      MYOMECTOMY       Past Medical History:   Diagnosis Date    Fatigue     Heart murmur     Hyperlipidemia     Hypertension 2025    Sleep apnea          Pre-op Assessment    I have reviewed the Patient Summary Reports.     I have reviewed the Nursing Notes. I have reviewed the NPO Status.   I have reviewed the Medications.     Review of Systems  Anesthesia Hx:  No problems with previous Anesthesia             Denies Family Hx of Anesthesia complications.    Denies Personal Hx of Anesthesia complications.                    Social:  Former Smoker, No Alcohol Use       Hematology/Oncology:  Hematology Normal   Oncology Normal                                   EENT/Dental:  EENT/Dental Normal           Cardiovascular:     Hypertension           hyperlipidemia                         Hypertension         Pulmonary:        Sleep Apnea     Obstructive Sleep Apnea (DOMINIC).           Renal/:  Renal/ Normal                 Hepatic/GI:  Hepatic/GI Normal Bowel Prep.                   Musculoskeletal:  Musculoskeletal Normal                Neurological:  Neurology Normal                                      Endocrine:  Endocrine Normal            Dermatological:  Skin Normal    Psych:  Psychiatric Normal                    Physical Exam  General: Well nourished, Cooperative, Alert and Oriented    Airway:  Mallampati: II   Mouth Opening: Normal  TM Distance: Normal  Tongue: Normal  Neck ROM: Normal ROM    Dental:  Intact        Anesthesia Plan  Type of Anesthesia, risks & benefits discussed:    Anesthesia Type: Gen Natural Airway  Intra-op Monitoring Plan: Standard ASA Monitors  Post Op Pain Control Plan: multimodal analgesia  Induction:   IV  Informed Consent: Informed consent signed with the Patient and all parties understand the risks and agree with anesthesia plan.  All questions answered. Patient consented to blood products? No  ASA Score: 2  Day of Surgery Review of History & Physical: H&P Update referred to the surgeon/provider.    Ready For Surgery From Anesthesia Perspective.     .

## 2025-06-18 ENCOUNTER — HOSPITAL ENCOUNTER (OUTPATIENT)
Dept: ENDOSCOPY | Facility: HOSPITAL | Age: 70
Discharge: HOME | End: 2025-06-18
Admitting: INTERNAL MEDICINE
Payer: MEDICARE

## 2025-06-18 ENCOUNTER — ANESTHESIA (OUTPATIENT)
Dept: ENDOSCOPY | Facility: HOSPITAL | Age: 70
End: 2025-06-18
Payer: MEDICARE

## 2025-06-18 DIAGNOSIS — Z12.11 COLON CANCER SCREENING: ICD-10-CM

## 2025-06-18 DIAGNOSIS — Z12.11 ENCOUNTER FOR SCREENING COLONOSCOPY: Primary | ICD-10-CM

## 2025-06-18 PROCEDURE — 63600175 PHARM REV CODE 636 W HCPCS: Performed by: NURSE ANESTHETIST, CERTIFIED REGISTERED

## 2025-06-18 PROCEDURE — 25000003 PHARM REV CODE 250: Performed by: INTERNAL MEDICINE

## 2025-06-18 PROCEDURE — 88305 TISSUE EXAM BY PATHOLOGIST: CPT | Mod: TC | Performed by: INTERNAL MEDICINE

## 2025-06-18 PROCEDURE — 25000003 PHARM REV CODE 250: Performed by: NURSE ANESTHETIST, CERTIFIED REGISTERED

## 2025-06-18 RX ORDER — LIDOCAINE HYDROCHLORIDE 20 MG/ML
INJECTION, SOLUTION EPIDURAL; INFILTRATION; INTRACAUDAL; PERINEURAL
Status: DISCONTINUED | OUTPATIENT
Start: 2025-06-18 | End: 2025-06-18

## 2025-06-18 RX ORDER — PROPOFOL 10 MG/ML
VIAL (ML) INTRAVENOUS
Status: DISCONTINUED | OUTPATIENT
Start: 2025-06-18 | End: 2025-06-18

## 2025-06-18 RX ORDER — PHENYLEPHRINE HYDROCHLORIDE 10 MG/ML
INJECTION INTRAVENOUS
Status: DISCONTINUED | OUTPATIENT
Start: 2025-06-18 | End: 2025-06-18

## 2025-06-18 RX ORDER — DEXTROMETHORPHAN/PSEUDOEPHED 2.5-7.5/.8
DROPS ORAL
Status: COMPLETED | OUTPATIENT
Start: 2025-06-18 | End: 2025-06-18

## 2025-06-18 RX ADMIN — SIMETHICONE 200 MG: 20 SUSPENSION/ DROPS ORAL at 07:06

## 2025-06-18 RX ADMIN — PROPOFOL 20 MG: 10 INJECTION, EMULSION INTRAVENOUS at 07:06

## 2025-06-18 RX ADMIN — PHENYLEPHRINE HYDROCHLORIDE 100 MCG: 10 INJECTION INTRAVENOUS at 08:06

## 2025-06-18 RX ADMIN — PROPOFOL 100 MG: 10 INJECTION, EMULSION INTRAVENOUS at 07:06

## 2025-06-18 RX ADMIN — SODIUM CHLORIDE: 9 INJECTION, SOLUTION INTRAVENOUS at 07:06

## 2025-06-18 RX ADMIN — PROPOFOL 30 MG: 10 INJECTION, EMULSION INTRAVENOUS at 07:06

## 2025-06-18 RX ADMIN — LIDOCAINE HYDROCHLORIDE 50 MG: 20 INJECTION, SOLUTION EPIDURAL; INFILTRATION; INTRACAUDAL; PERINEURAL at 07:06

## 2025-06-18 NOTE — DISCHARGE INSTRUCTIONS
During your procedure today, you received medications for sedation.  These   medications may affect your judgment, balance and coordination.  Therefore,   for 24 hours, you have the following restrictions:   - DO NOT drive a car, operate machinery, make legal/financial decisions,   sign important papers or drink alcohol.    ACTIVITY:  Today: no heavy lifting, straining or running due to procedural   sedation/anesthesia.  The following day: return to full activity including work.  DIET:  Eat and drink normally unless instructed otherwise.                TREATMENT FOR COMMON SIDE EFFECTS:  - Mild abdominal pain, nausea, belching, bloating or excessive gas:  rest,   eat lightly and use a heating pad.  - Sore Throat: treat with throat lozenges and/or gargle with warm salt   water.  - Because air was used during the procedure, expelling large amounts of air   from your rectum or belching is normal.  - If a bowel prep was taken, you may not have a bowel movement for 1-3 days.    This is normal.  SYMPTOMS TO WATCH FOR AND REPORT TO YOUR PHYSICIAN:  1. Abdominal pain or bloating, other than gas cramps.  2. Chest pain.  3. Back pain.  4. Signs of infection such as: chills or fever occurring within 24 hours   after the procedure.  5. Rectal bleeding, which would show as bright red, maroon, or black stools.   (A tablespoon of blood from the rectum is not serious, especially if   hemorrhoids are present.)  6. Vomiting.  7. Weakness or dizziness.  GO DIRECTLY TO THE NEAREST EMERGENCY ROOM IF YOU HAVE ANY OF THE FOLLOWING:                 Difficulty breathing              Chills and/or fever over 101 F              Persistent vomiting and/or vomiting blood              Severe abdominal pain              Severe chest pain              Black, tarry stools              Bleeding- more than one tablespoon              Any other symptom or condition that you feel may need urgent attention    Your doctor recommends these additional  instructions:  If any biopsies were taken, your doctors clinic will contact you in 1 to 2   weeks with any results.  - Discharge patient to home.   - Resume previous diet.   - Continue present medications.    - Repeat colonoscopy in _5_ years for surveillance.   - Return to referring physician as previously scheduled.   - Patient has a contact number available for emergencies.  The signs and   symptoms of potential delayed complications were discussed with the   patient.  Return to normal activities tomorrow.  Written discharge   instructions were provided to the patient.  If you have any questions about the above instructions, call the GI   department at [phone] or call the endoscopy unit at [phone]   from 7am until 3 pm.  [facility] [place], EMERGENCY ROOM PHONE NUMBER:   [phone]  IF A COMPLICATION OR EMERGENCY SITUATION ARISES AND YOU ARE UNABLE TO REACH   YOUR PHYSICIAN - GO DIRECTLY TO THE EMERGENCY ROOM.  I have read or have had read to me these discharge instructions for my   procedure and have received a written copy.  I understand these   instructions and will follow-up with my physician if I have any questions.

## 2025-06-18 NOTE — PLAN OF CARE
Discharge instructions reviewed with patient and visitor. Handouts given & verbalized understanding with no further questions at this time. Dr. Blackwell spoke to pt at bedside, reviewed procedure and findings, answered questions. Made aware they are awaiting biopsy results with MD telephone number provided per AVS sheet. VSS on RA, no pain or nausea noted, tolerating po fluids, no complaints noted. Fall precautions reviewed, consents in chart, PIV removed at this time.

## 2025-06-18 NOTE — TRANSFER OF CARE
"Anesthesia Transfer of Care Note    Patient: Juliane Ross    Procedure(s) Performed: * No procedures listed *    Patient location: PACU    Anesthesia Type: general    Transport from OR: Transported from OR on room air with adequate spontaneous ventilation    Post pain: adequate analgesia    Post assessment: no apparent anesthetic complications and tolerated procedure well    Post vital signs: stable    Level of consciousness: awake    Nausea/Vomiting: no nausea/vomiting    Complications: none    Transfer of care protocol was followed      Last vitals: Visit Vitals  BP (!) 146/88 (BP Location: Right arm, Patient Position: Sitting)   Pulse 90   Temp 36.2 °C (97.2 °F) (Temporal)   Resp 18   Ht 5' 3" (1.6 m)   Wt 73.2 kg (161 lb 7.8 oz)   SpO2 100%   Breastfeeding No   BMI 28.61 kg/m²     "

## 2025-06-18 NOTE — DISCHARGE SUMMARY
[facility] - Endoscopy 1st Fl  Discharge Note  Short Stay    Colonoscopy      OUTCOME: Patient tolerated treatment/procedure well without complication and is now ready for discharge.    DISPOSITION: Home or Self Care    FINAL DIAGNOSIS:  Encounter for screening colonoscopy    FOLLOWUP: With primary care provider    DISCHARGE INSTRUCTIONS:  No discharge procedures on file.

## 2025-06-18 NOTE — ANESTHESIA POSTPROCEDURE EVALUATION
Anesthesia Post Evaluation    Patient: Juliane Ross    Procedure(s) Performed: * No procedures listed *    Final Anesthesia Type: general      Patient location during evaluation: PACU  Patient participation: Yes- Able to Participate  Level of consciousness: awake  Post-procedure vital signs: reviewed and stable  Pain management: adequate  Airway patency: patent    PONV status at discharge: No PONV  Anesthetic complications: no      Cardiovascular status: stable  Respiratory status: unassisted  Hydration status: euvolemic  Follow-up not needed.              Vitals Value Taken Time   BP 94/58 06/18/25 08:18   Temp 36.1 °C (96.9 °F) 06/18/25 08:09   Pulse 57 06/18/25 08:22   Resp 19 06/18/25 08:22   SpO2 100 % 06/18/25 08:22   Vitals shown include unfiled device data.      No case tracking events are documented in the log.      Pain/Kamla Score: Kamla Score: 10 (6/18/2025  8:19 AM)

## 2025-06-18 NOTE — H&P
Short Stay Endoscopy History and Physical    PCP - No, Primary Doctor    Procedure - Colonoscopy  ASA - per anesthesia  Mallampati - per anesthesia  History of Anesthesia problems - no  Family history Anesthesia problems -  no     HPI:  This is a 69 y.o. female here for evaluation of :   Active Hospital Problems    Diagnosis  POA    *Encounter for screening colonoscopy [Z12.11]  Not Applicable      Resolved Hospital Problems   No resolved problems to display.         Health Maintenance         Date Due Completion Date    TETANUS VACCINE Never done ---    Shingles Vaccine (1 of 2) Never done ---    Pneumococcal Vaccines (Age 50+) (1 of 1 - PCV) Never done ---    RSV Vaccine (Age 60+ and Pregnant patients) (1 - Risk 60-74 years 1-dose series) Never done ---    Colorectal Cancer Screening 2023 3/8/2013    Influenza Vaccine (Season Ended) 2025    Hemoglobin A1c (Prediabetes) 2026    Mammogram 2026    DEXA Scan 2028    Lipid Panel 2030              ROS:  CONSTITUTIONAL: Denies weight change,  fatigue, fevers, chills, night sweats.  CARDIOVASCULAR: Denies chest pain, shortness of breath, orthopnea and edema.  RESPIRATORY: Denies cough, hemoptysis, dyspnea, and wheezing.  GI: See HPI.    Medical History:   Past Medical History:   Diagnosis Date    Fatigue     Heart murmur     Hyperlipidemia     Hypertension 2025    Sleep apnea        Surgical History:   Past Surgical History:   Procedure Laterality Date    CATARACT EXTRACTION Bilateral      SECTION      MYOMECTOMY         Family History:   Family History   Problem Relation Name Age of Onset    Breast cancer Mother      Cancer Mother      Breast cancer Sister      Hypertension Sister      Arthritis Sister      Arthritis Sister      Asthma Daughter      Breast cancer Maternal Aunt      Diabetes Paternal Aunt      Cancer Paternal Grandmother      Diabetes Paternal  Grandfather         Social History:   Social History[1]    Allergies:   Review of patient's allergies indicates:   Allergen Reactions    Perfume ht52 Shortness Of Breath       Medications:   Medications Ordered Prior to Encounter[2]    Physical Exam:  Vital Signs:   Vitals:    25 0651   BP: (!) 146/88   Pulse: 90   Resp: 18   Temp: 97.2 °F (36.2 °C)     General Appearance: Well appearing in no acute distress  ENT: OP clear  Chest: CTA B  CV: RRR, no m/r/g  Abd: s/nt/nd/nabs  Ext: no edema    Labs:Reviewed    IMP:  Active Hospital Problems    Diagnosis  POA    *Encounter for screening colonoscopy [Z12.11]  Not Applicable      Resolved Hospital Problems   No resolved problems to display.         Plan:   I have explained the risks and benefits of colonoscopy to the patient including but not limited to bleeding, perforation, infection, and death. The patient wishes to proceed.         [1]   Social History  Tobacco Use    Smoking status: Former     Current packs/day: 0.00     Types: Cigarettes     Quit date: 1988     Years since quittin.0     Passive exposure: Past    Smokeless tobacco: Never   Substance Use Topics    Alcohol use: No    Drug use: No   [2]   Current Outpatient Medications on File Prior to Encounter   Medication Sig Dispense Refill    ascorbic acid, vitamin C, (VITAMIN C) 500 MG tablet Take 500 mg by mouth once daily.      CALCIUM CARBONATE/VITAMIN D3 (CALCIUM 500 + D ORAL) Take by mouth once daily.      cholecalciferol, vitamin D3, (VITAMIN D3) 25 mcg (1,000 unit) capsule Take 1,000 Units by mouth once daily.      cinnamon bark (CINNAMON) 500 mg capsule Take 500 mg by mouth once daily.      fish oil-omega-3 fatty acids 300-1,000 mg capsule Take 2 g by mouth once daily.      fluticasone propionate (FLONASE) 50 mcg/actuation nasal spray 1 spray (50 mcg total) by Each Nostril route once daily. 16 mL 0    losartan (COZAAR) 50 MG tablet Take 1 tablet (50 mg total) by mouth once daily. 90 tablet  2    magnesium 30 mg Tab Take by mouth.      melatonin 10 mg Chew Take 1 each by mouth every evening.      multivitamin (THERAGRAN) per tablet Take 1 tablet by mouth once daily.      rosuvastatin (CRESTOR) 5 MG tablet Take 1 tablet (5 mg total) by mouth every evening. 90 tablet 3    nystatin (MYCOSTATIN) cream Apply topically 3 (three) times daily. 30 g 0    nystatin (MYCOSTATIN) powder Apply topically 4 (four) times daily. 60 g 0    triamcinolone acetonide 0.1% (KENALOG) 0.1 % ointment Apply topically daily as needed (Dermatitis). 15 g 0     No current facility-administered medications on file prior to encounter.

## 2025-06-19 VITALS
OXYGEN SATURATION: 99 % | RESPIRATION RATE: 15 BRPM | BODY MASS INDEX: 28.62 KG/M2 | SYSTOLIC BLOOD PRESSURE: 113 MMHG | WEIGHT: 161.5 LBS | TEMPERATURE: 97 F | HEIGHT: 63 IN | DIASTOLIC BLOOD PRESSURE: 64 MMHG | HEART RATE: 51 BPM

## 2025-06-20 LAB
ESTROGEN SERPL-MCNC: NORMAL PG/ML
INSULIN SERPL-ACNC: NORMAL U[IU]/ML
LAB AP CLINICAL INFORMATION: NORMAL
LAB AP GROSS DESCRIPTION: NORMAL
LAB AP PERFORMING LOCATION(S): NORMAL
LAB AP REPORT FOOTNOTES: NORMAL

## 2025-07-18 ENCOUNTER — LAB VISIT (OUTPATIENT)
Dept: LAB | Facility: HOSPITAL | Age: 70
End: 2025-07-18
Payer: MEDICARE

## 2025-07-18 DIAGNOSIS — E78.5 HYPERLIPIDEMIA, UNSPECIFIED HYPERLIPIDEMIA TYPE: ICD-10-CM

## 2025-07-18 LAB
CHOLEST SERPL-MCNC: 245 MG/DL (ref 120–199)
CHOLEST/HDLC SERPL: 3.1 {RATIO} (ref 2–5)
HDLC SERPL-MCNC: 79 MG/DL (ref 40–75)
HDLC SERPL: 32.2 % (ref 20–50)
LDLC SERPL CALC-MCNC: 152.2 MG/DL (ref 63–159)
NONHDLC SERPL-MCNC: 166 MG/DL
TRIGL SERPL-MCNC: 69 MG/DL (ref 30–150)

## 2025-07-18 PROCEDURE — 82465 ASSAY BLD/SERUM CHOLESTEROL: CPT

## 2025-07-18 PROCEDURE — 36415 COLL VENOUS BLD VENIPUNCTURE: CPT | Mod: PO

## 2025-07-22 ENCOUNTER — PATIENT MESSAGE (OUTPATIENT)
Dept: RESEARCH | Facility: HOSPITAL | Age: 70
End: 2025-07-22
Payer: MEDICARE

## 2025-07-25 ENCOUNTER — OFFICE VISIT (OUTPATIENT)
Dept: INTERNAL MEDICINE | Facility: CLINIC | Age: 70
End: 2025-07-25
Payer: MEDICARE

## 2025-07-25 VITALS
WEIGHT: 160.94 LBS | BODY MASS INDEX: 31.6 KG/M2 | DIASTOLIC BLOOD PRESSURE: 72 MMHG | SYSTOLIC BLOOD PRESSURE: 114 MMHG | TEMPERATURE: 97 F | OXYGEN SATURATION: 98 % | HEIGHT: 60 IN | HEART RATE: 81 BPM

## 2025-07-25 DIAGNOSIS — E78.2 MIXED HYPERLIPIDEMIA: Primary | ICD-10-CM

## 2025-07-25 DIAGNOSIS — I34.1 MITRAL VALVE PROLAPSE: ICD-10-CM

## 2025-07-25 DIAGNOSIS — K57.90 DIVERTICULOSIS: ICD-10-CM

## 2025-07-25 DIAGNOSIS — L30.9 DERMATITIS OF EXTERNAL EAR: ICD-10-CM

## 2025-07-25 DIAGNOSIS — Z80.9 FAMILY HISTORY OF CANCER: ICD-10-CM

## 2025-07-25 DIAGNOSIS — Z76.89 ENCOUNTER TO ESTABLISH CARE: ICD-10-CM

## 2025-07-25 DIAGNOSIS — L30.4 PRURITIC INTERTRIGO: ICD-10-CM

## 2025-07-25 DIAGNOSIS — I10 PRIMARY HYPERTENSION: Chronic | ICD-10-CM

## 2025-07-25 DIAGNOSIS — R73.03 PREDIABETES: ICD-10-CM

## 2025-07-25 DIAGNOSIS — D44.3 NEOPLASM OF UNCERTAIN BEHAVIOR OF PITUITARY GLAND: ICD-10-CM

## 2025-07-25 DIAGNOSIS — Z23 NEED FOR VACCINATION: ICD-10-CM

## 2025-07-25 DIAGNOSIS — G47.33 OSA (OBSTRUCTIVE SLEEP APNEA): ICD-10-CM

## 2025-07-25 PROCEDURE — 99214 OFFICE O/P EST MOD 30 MIN: CPT | Mod: PBBFAC,PO | Performed by: FAMILY MEDICINE

## 2025-07-25 PROCEDURE — 90677 PCV20 VACCINE IM: CPT | Mod: PBBFAC,PO

## 2025-07-25 PROCEDURE — 99999PBSHW PR PBB SHADOW TECHNICAL ONLY FILED TO HB: Mod: PBBFAC,,,

## 2025-07-25 PROCEDURE — 99999 PR PBB SHADOW E&M-EST. PATIENT-LVL IV: CPT | Mod: PBBFAC,,, | Performed by: FAMILY MEDICINE

## 2025-07-25 PROCEDURE — G0009 ADMIN PNEUMOCOCCAL VACCINE: HCPCS | Mod: PBBFAC,PO

## 2025-07-25 RX ORDER — NYSTATIN 100000 U/G
CREAM TOPICAL 3 TIMES DAILY
Qty: 30 G | Refills: 3 | Status: SHIPPED | OUTPATIENT
Start: 2025-07-25

## 2025-07-25 RX ORDER — NYSTATIN 100000 [USP'U]/G
POWDER TOPICAL 4 TIMES DAILY
Qty: 60 G | Refills: 6 | Status: SHIPPED | OUTPATIENT
Start: 2025-07-25

## 2025-07-25 RX ORDER — TRIAMCINOLONE ACETONIDE 1 MG/G
OINTMENT TOPICAL DAILY PRN
Qty: 15 G | Refills: 1 | Status: SHIPPED | OUTPATIENT
Start: 2025-07-25

## 2025-07-25 RX ORDER — EZETIMIBE 10 MG/1
10 TABLET ORAL DAILY
Qty: 30 TABLET | Refills: 1 | Status: SHIPPED | OUTPATIENT
Start: 2025-07-25

## 2025-07-25 RX ADMIN — PNEUMOCOCCAL 20-VALENT CONJUGATE VACCINE 0.5 ML
2.2; 2.2; 2.2; 2.2; 2.2; 2.2; 2.2; 2.2; 2.2; 2.2; 2.2; 2.2; 2.2; 2.2; 2.2; 2.2; 4.4; 2.2; 2.2; 2.2 INJECTION, SUSPENSION INTRAMUSCULAR at 12:07

## 2025-07-28 ENCOUNTER — HOSPITAL ENCOUNTER (OUTPATIENT)
Dept: CARDIOLOGY | Facility: HOSPITAL | Age: 70
Discharge: HOME | End: 2025-07-28
Attending: FAMILY MEDICINE
Payer: MEDICARE

## 2025-07-28 VITALS
BODY MASS INDEX: 31.41 KG/M2 | SYSTOLIC BLOOD PRESSURE: 114 MMHG | HEIGHT: 60 IN | WEIGHT: 160 LBS | DIASTOLIC BLOOD PRESSURE: 72 MMHG

## 2025-07-28 DIAGNOSIS — I34.1 MITRAL VALVE PROLAPSE: ICD-10-CM

## 2025-07-28 PROCEDURE — 93306 TTE W/DOPPLER COMPLETE: CPT

## 2025-07-28 PROCEDURE — 93306 TTE W/DOPPLER COMPLETE: CPT | Mod: 26,,, | Performed by: INTERNAL MEDICINE

## 2025-07-29 ENCOUNTER — PATIENT MESSAGE (OUTPATIENT)
Dept: HEMATOLOGY/ONCOLOGY | Facility: CLINIC | Age: 70
End: 2025-07-29
Payer: MEDICARE

## 2025-07-29 LAB
AORTIC ROOT ANNULUS: 2.6 CM
AORTIC SIZE INDEX (SOV): 1.5 CM/M2
AORTIC SIZE INDEX: 1.9 CM/M2
ASCENDING AORTA: 3.2 CM
AV INDEX (PROSTH): 0.74
AV MEAN GRADIENT: 10 MMHG
AV PEAK GRADIENT: 19 MMHG
AV VALVE AREA BY VELOCITY RATIO: 2.4 CM²
AV VALVE AREA: 2.3 CM²
AV VELOCITY RATIO: 0.77
BSA FOR ECHO PROCEDURE: 1.75 M2
CV ECHO LV RWT: 0.33 CM
DOP CALC AO PEAK VEL: 2.2 M/S
DOP CALC AO VTI: 48.5 CM
DOP CALC LVOT AREA: 3.1 CM2
DOP CALC LVOT DIAMETER: 2 CM
DOP CALC LVOT PEAK VEL: 1.7 M/S
DOP CALC LVOT STROKE VOLUME: 112.1 CM3
DOP CALC RVOT PEAK VEL: 0.75 M/S
DOP CALC RVOT VTI: 20.4 CM
DOP CALCLVOT PEAK VEL VTI: 35.7 CM
E WAVE DECELERATION TIME: 172 MSEC
E/A RATIO: 1.1
E/E' RATIO: 10 M/S
ECHO LV POSTERIOR WALL: 0.7 CM (ref 0.6–1.1)
FRACTIONAL SHORTENING: 32.6 % (ref 28–44)
INTERVENTRICULAR SEPTUM: 0.7 CM (ref 0.6–1.1)
IVRT: 69 MSEC
LA MAJOR: 5.1 CM
LA MINOR: 4.8 CM
LA WIDTH: 4.2 CM
LEFT ATRIUM AREA SYSTOLIC (APICAL 2 CHAMBER): 17.62 CM2
LEFT ATRIUM AREA SYSTOLIC (APICAL 4 CHAMBER): 19.83 CM2
LEFT ATRIUM SIZE: 2.9 CM
LEFT ATRIUM VOLUME INDEX MOD: 33 ML/M2
LEFT ATRIUM VOLUME INDEX: 30 ML/M2
LEFT ATRIUM VOLUME MOD: 56 ML
LEFT ATRIUM VOLUME: 51 CM3
LEFT INTERNAL DIMENSION IN SYSTOLE: 2.9 CM (ref 2.1–4)
LEFT VENTRICLE DIASTOLIC VOLUME INDEX: 49.41 ML/M2
LEFT VENTRICLE DIASTOLIC VOLUME: 84 ML
LEFT VENTRICLE END SYSTOLIC VOLUME APICAL 2 CHAMBER: 50.86 ML
LEFT VENTRICLE END SYSTOLIC VOLUME APICAL 4 CHAMBER: 57.71 ML
LEFT VENTRICLE MASS INDEX: 52.1 G/M2
LEFT VENTRICLE SYSTOLIC VOLUME INDEX: 18.8 ML/M2
LEFT VENTRICLE SYSTOLIC VOLUME: 32 ML
LEFT VENTRICULAR INTERNAL DIMENSION IN DIASTOLE: 4.3 CM (ref 3.5–6)
LEFT VENTRICULAR MASS: 88.5 G
LV LATERAL E/E' RATIO: 10.1 M/S
LV SEPTAL E/E' RATIO: 10.1 M/S
LVED V (TEICH): 83.83 ML
LVES V (TEICH): 31.85 ML
LVOT MG: 5.97 MMHG
LVOT MV: 1.16 CM/S
LVOT STOKE VOLUME INDEX: 65.9 ML/M2
Lab: 1.7 CM/M
Lab: 2.1 CM/M
MV PEAK A VEL: 1.01 M/S
MV PEAK E VEL: 1.11 M/S
OHS CV CPX PATIENT HEIGHT IN: 60
OHS CV RV/LV RATIO: 0.81 CM
PISA TR MAX VEL: 2.6 M/S
PULM VEIN S/D RATIO: 1.06
PV MEAN GRADIENT: 1 MMHG
PV MV: 0.75 M/S
PV PEAK D VEL: 0.72 M/S
PV PEAK GRADIENT: 4 MMHG
PV PEAK S VEL: 0.76 M/S
PV PEAK VELOCITY: 1.05 M/S
RA MAJOR: 4.92 CM
RA PRESSURE ESTIMATED: 3 MMHG
RA WIDTH: 3.59 CM
RIGHT VENTRICLE DIASTOLIC BASEL DIMENSION: 3.5 CM
RIGHT VENTRICULAR END-DIASTOLIC DIMENSION: 3.48 CM
RV TB RVSP: 6 MMHG
SINUS: 2.6 CM
STJ: 2.1 CM
TDI LATERAL: 0.11 M/S
TDI SEPTAL: 0.11 M/S
TDI: 0.11 M/S
TR MAX PG: 27 MMHG
TRICUSPID ANNULAR PLANE SYSTOLIC EXCURSION: 2.8 CM
TV REST PULMONARY ARTERY PRESSURE: 30 MMHG
Z-SCORE OF LEFT VENTRICULAR DIMENSION IN END DIASTOLE: -0.95
Z-SCORE OF LEFT VENTRICULAR DIMENSION IN END SYSTOLE: -0.08

## 2025-08-05 ENCOUNTER — TELEPHONE (OUTPATIENT)
Dept: HEMATOLOGY/ONCOLOGY | Facility: CLINIC | Age: 70
End: 2025-08-05
Payer: MEDICARE

## 2025-08-05 ENCOUNTER — RESEARCH ENCOUNTER (OUTPATIENT)
Dept: RESEARCH | Facility: HOSPITAL | Age: 70
End: 2025-08-05
Payer: MEDICARE

## 2025-08-05 PROBLEM — R73.03 PREDIABETES: Status: ACTIVE | Noted: 2025-08-05

## 2025-08-05 PROBLEM — L30.9 DERMATITIS OF EXTERNAL EAR: Status: ACTIVE | Noted: 2025-08-05

## 2025-08-05 PROBLEM — Z80.9 FAMILY HISTORY OF CANCER: Status: ACTIVE | Noted: 2025-08-05

## 2025-08-05 PROBLEM — K57.90 DIVERTICULOSIS: Status: ACTIVE | Noted: 2025-08-05

## 2025-08-05 NOTE — PROGRESS NOTES
Subjective     Patient ID: Juliane Ross is a 69 y.o. female.    Chief Complaint: Establish Care    History of Present Illness    PRESENTATION:  Juliane presents for an initial visit to establish care and discuss management of high cholesterol after discontinuing rosuvastatin due to side effects.    HPI:  Juliane previously took rosuvastatin for high cholesterol but discontinued due to adverse effects including tea-colored urine, fatigue, and difficulty completing workouts. These symptoms resolved after discontinuation. Her cholesterol remains elevated and she is seeking alternative treatment options.    She drinks yerba mate in the mornings, which temporarily elevates her blood pressure to around 130/79, returning to normal later in the day.    She has a history of pre-diabetes with an A1C of 5.8, improved from a previous level of 6.1. She is taking cinnamon supplements to help manage this condition.    She reports recurrent itching issues, possibly related to yeast.     She has recurring rashes on her ears, potentially related to hair products. The rash sometimes starts on the front of the ear and spreads to the back when manipulated. She uses triamcinolone ointment, which helps, but the issue still recurs.    She has a pituitary tumor, monitored through periodic peripheral vision checks, and a history of cataracts.    She has a known heart murmur, specifically mitral valve prolapse, diagnosed many years ago but not recently assessed.    She has sleep apnea but is unable to tolerate the CPAP machine due to discomfort and inability to sleep on her abdomen.    She was recently diagnosed with diverticulosis during a colonoscopy.    She denies any current problems related to the pituitary tumor or swelling in her legs.    MEDICATIONS:  Juliane is on a low dosage of Losartan for hypertension. She drinks yerba mate in the morning, which elevates her blood pressure to 130s/79. She takes a cinnamon supplement for  blood sugar control. Triamcinolone is used for an ear rash. Juliane discontinued rosuvastatin due to side effects including urine color change to tea-like, fatigue, and affected workouts.    MEDICAL HISTORY:  Juliane has a history of hypercholesterolemia, hypertension, and pre-diabetes with an A1C of 5.8, improved from 6.1. She also has a pituitary tumor and a heart murmur (mitral valve prolapse). Sleep apnea was previously diagnosed, but she is not currently using CPAP. Diverticulosis was recently diagnosed via colonoscopy. Juliane experiences recurrent yeast infections and has cataracts.    FAMILY HISTORY:  Family history is significant for mother and aunt who  from breast cancer. Her sister has triple negative breast cancer. Two cousins passed away from lupus, and some siblings of these cousins have lupus.    SURGICAL HISTORY:  Juliane recently underwent a breast biopsy for a fibroid.    TEST RESULTS:  A recent lipid panel showed a total cholesterol of 245, with triglycerides and HDL within normal limits. LDL decreased significantly but remains elevated. Juliane's recent A1C was 5.8, an improvement from the previous 6.1. When taking rosuvastatin, a urinalysis revealed discolored urine. An EKG in  showed bradycardia but was otherwise normal. A past sleep study diagnosed the patient with sleep apnea, though she no longer uses CPAP.    IMAGING:  A recent mammogram revealed an abnormal finding requiring biopsy. Juliane underwent a colonoscopy about 1-2 weeks ago, which found diverticulosis.    ALLERGIES:  Juliane has multiple allergies.    SOCIAL HISTORY:  Marital status:     ROS:  General: -fever, -chills, -fatigue, -weight gain, -weight loss  Eyes: -vision changes, -redness, -discharge  ENT: -ear pain, -nasal congestion, -sore throat, +ear pruritus, +ear discharge  Cardiovascular: -chest pain, -palpitations, -lower extremity edema  Respiratory: -cough, -shortness of breath  Gastrointestinal:  -abdominal pain, -nausea, -vomiting, -diarrhea, -constipation, -blood in stool  Genitourinary: -dysuria, -hematuria, -frequency  Musculoskeletal: -joint pain, -muscle pain  Skin: +rash, -lesion, +genital itching, +excessive sweating  Neurological: -headache, -dizziness, -numbness, -tingling  Psychiatric: -anxiety, -depression, +sleep difficulty            Objective     Physical Exam  Vitals and nursing note reviewed.   Constitutional:       Appearance: Normal appearance. She is obese.   HENT:      Head: Normocephalic and atraumatic.      Nose: Nose normal.      Mouth/Throat:      Mouth: Mucous membranes are moist.      Pharynx: Oropharynx is clear.   Eyes:      Extraocular Movements: Extraocular movements intact.      Conjunctiva/sclera: Conjunctivae normal.   Cardiovascular:      Rate and Rhythm: Normal rate and regular rhythm.      Pulses: Normal pulses.      Heart sounds: Normal heart sounds.   Pulmonary:      Effort: Pulmonary effort is normal.      Breath sounds: Normal breath sounds.   Abdominal:      General: Abdomen is flat. Bowel sounds are normal.      Palpations: Abdomen is soft.   Musculoskeletal:         General: Normal range of motion.      Cervical back: Normal range of motion and neck supple.   Skin:     General: Skin is warm and dry.   Neurological:      General: No focal deficit present.      Mental Status: She is alert and oriented to person, place, and time.   Psychiatric:         Mood and Affect: Mood normal.         Behavior: Behavior normal.                Assessment and Plan     1. Mixed hyperlipidemia  -     ezetimibe (ZETIA) 10 mg tablet; Take 1 tablet (10 mg total) by mouth once daily.  Dispense: 30 tablet; Refill: 1  -     Lipid Panel; Future; Expected date: 10/25/2025  -     Comprehensive Metabolic Panel; Future; Expected date: 10/25/2025    2. Family history of cancer  -     Ambulatory referral/consult to Genetics; Future; Expected date: 08/01/2025    3. Pruritic intertrigo  -      nystatin (MYCOSTATIN) cream; Apply topically 3 (three) times daily.  Dispense: 30 g; Refill: 3  -     nystatin (MYCOSTATIN) powder; Apply topically 4 (four) times daily.  Dispense: 60 g; Refill: 6    4. Dermatitis of external ear  -     triamcinolone acetonide 0.1% (KENALOG) 0.1 % ointment; Apply topically daily as needed (Dermatitis).  Dispense: 15 g; Refill: 1    5. Mitral valve prolapse  -     Echo; Future; Expected date: 07/25/2025    6. DOMINIC (obstructive sleep apnea)    7. Prediabetes  Comments:  stable cont diet control  Orders:  -     HEMOGLOBIN A1C; Future; Expected date: 10/25/2025  -     Comprehensive Metabolic Panel; Future; Expected date: 10/25/2025    8. Need for vaccination  -     pneumoc 20-julianne conj-dip cr(PF) (PREVNAR-20 (PF)) injection Syrg 0.5 mL    9. Encounter to establish care    10. 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 Diverticulosis        Assessment & Plan    LDL still elevated but ratio low at 3.1, indicating lower cardiovascular risk.  BP control adequate with losartan; BP at goal.  A1C improved from 6.1 to 5.8, borderline but not requiring medication at this time.  Diverticulosis found on recent colonoscopy; no specific treatment needed beyond dietary modifications.    PATIENT EDUCATION:   Explained diverticulosis as weakening/pouches in intestinal lining; discussed potential for infection if small particles get trapped.   Discussed Zetia mechanism of action as non-statin cholesterol medication with potentially fewer side effects.   Explained A1C level and prediabetes.    ACTION ITEMS/LIFESTYLE:   Juliane to continue current diet and exercise regimen to maintain A1C and cholesterol improvements.   Recommend increasing water intake to address mild dehydration.   Recommend increasing fiber intake to manage diverticulosis.   Juliane to consider spacing out yerba mate tea consumption to every other day if BP  elevation persists.   Recommend introducing one new supplement at a time to monitor effects.    MEDICATIONS:   Started Zetia 30-day supply for cholesterol management due to previous intolerance to rosuvastatin. Report any side effects.   Continued losartan at current dose for BP management.   Refilled Nystatin cream and powder for yeast-related itching.   Refilled triamcinolone for ear rash.    ORDERS:   Administered pneumonia vaccine in office.   Ordered echocardiogram to evaluate mitral valve prolapse progression.   Ordered CBC, lipid panel, CMP, and hemoglobin A1C in 3 months.    REFERRALS:   Referred for adult cancer genetics evaluation due to family history of breast cancer.    FOLLOW UP:   Follow up in 3 months to reassess cholesterol levels, blood sugar, and overall health status.   Contact office if experiencing any side effects from Zetia.              Follow up in about 3 months (around 10/25/2025).    This note was generated with the assistance of ambient listening technology. Verbal consent was obtained by the patient and accompanying visitor(s) for the recording of patient appointment to facilitate this note. I attest to having reviewed and edited the generated note for accuracy, though some syntax or spelling errors may persist. Please contact the author of this note for any clarification.

## 2025-08-05 NOTE — PROGRESS NOTES
Study Title: [facility]ANDIS: Real-world Evidence to Advance Multi-Cancer Early Detection Health Equity (REACH/Galleri-Medicare study)    Protocol IRB #: [id number]     Sponsor: NA    : Zeke Adams MD    Patient eligibility was checked prior to enrollment in the study. Patient met the following inclusion and exclusion criteria:     INCLUSION CRITERIA  Participant age is 50 years or older with Medicare coverage at the time of signing the Informed Consent form  Participant is eligible to receive the Galleri test, based on a determination by the study investigator or designee  Participant is capable of giving signed Informed Consent that is legally effective (consent provided by LAR is not permitted)  Participant is able to comprehend and respond to questions in participant questionnaires.    EXCLUSION CRITERIA  Participant having had a previous Galleri test not associated with this study  Participant is undergoing or referred for diagnostic evaluation due to clinical suspicion for cancer  Participant has a personal history of invasive or hematologic malignancy, diagnosed within the last 3 years prior to expected enrollment date or diagnosed greater than 3 years prior to expected enrollment date and never treated  Participant has had definitive treatment for invasive or hematologic malignancy within the 3 years prior to expected enrollment date  Participant is not able to comply with protocol procedures  Participant is not a current patient at a participating center  Participant is currently enrolled or was previously enrolled in another [facility]-sponsored study  Participant is current or previous employee/contractor of [employer]  Participant is currently pregnant (by participant's self-report of pregnancy status)    DOCUMENTATION OF INFORMED CONSENT    Prior to the Informed Consent (IC) being signed, or any study protocol required data collection, testing, procedure, or intervention being performed, the  following was done and/or discussed:  Patient was given a copy of the IC for review   Purpose of the study and qualifications to participate   Study design, Follow up schedule, and tests or procedures done at each visit  Confidentiality and HIPAA Authorization for Release of Medical Records for the research trial/ subject's rights/research related injury  Risk, Benefits, Alternative Treatments, Compensation and Costs  Participation in the research trial is voluntary and patient may withdraw at anytime  Contact information for study related questions    Patient verbalizes understanding of the above: Yes  Contact information for CRC and PI given to patient: Yes  Patient able to adequately summarize: the purpose of the study, the risks associated with the study, and all procedures, testing, and follow-ups associated with the study: Yes    Patient signed the informed consent form for the research study with an IRB approval date of JULY 02 2024. Each page of the consent form was reviewed with patient and all questions answered satisfactorily. Patient received a copy of the consent form. The original consent was scanned into electronic medical records.    INSURANCE VERIFICATION    Thoroughly discussed with patient that the study team does not expect them to be billed for this test. However, by participating in this trial, we cannot guarantee that they will not receive a bill, as cost ultimately depends on the details of their Medicare coverage. Patient voiced understanding.      BLOOD DRAW    Following IC being signed and prior to blood draw, patient completed all baseline/pretest questionnaires. The following specimens were collected from the pt at the time of this encounter via peripheral blood draw.    Blood draw location: Left Arm  Needle used: 21 gauge butterfly needle  Blood draw amount: 20ml  Blood draw time: 11:52 AM

## 2025-08-06 ENCOUNTER — TELEPHONE (OUTPATIENT)
Dept: INTERNAL MEDICINE | Facility: CLINIC | Age: 70
End: 2025-08-06
Payer: MEDICARE

## 2025-08-06 NOTE — TELEPHONE ENCOUNTER
----- Message from Yanet Tyson MD sent at 8/1/2025  9:21 AM CDT -----  Overall echo looks fine. Normal function of the heart. There is no mitral valve prolapse. Patient does have mild aortic stenosis which is stiffness of the valve but is not affecting how the heart is   pumping. She does have a slight elevation in the artery pressure. I would like to refer to cardiology so it can be reviewed and monitored. If patient is ok with referral let me know so I can order   it.  ----- Message -----  From: Neo Causey MD  Sent: 7/29/2025   2:34 PM CDT  To: Yanet Tyson MD

## 2025-08-06 NOTE — TELEPHONE ENCOUNTER
I called pt to give her lab results . Pt verbalized understanding and stated she is ok with Dr Roberto sending a referral to cardiologist

## 2025-08-07 DIAGNOSIS — I35.0 AORTIC VALVE STENOSIS, ETIOLOGY OF CARDIAC VALVE DISEASE UNSPECIFIED: Primary | ICD-10-CM

## 2025-08-15 DIAGNOSIS — I38 VALVULAR HEART DISEASE: ICD-10-CM

## 2025-08-15 DIAGNOSIS — Z76.89 ENCOUNTER TO ESTABLISH CARE: Primary | ICD-10-CM

## 2025-08-18 ENCOUNTER — HOSPITAL ENCOUNTER (OUTPATIENT)
Dept: CARDIOLOGY | Facility: HOSPITAL | Age: 70
Discharge: HOME | End: 2025-08-18
Attending: INTERNAL MEDICINE
Payer: MEDICARE

## 2025-08-18 ENCOUNTER — OFFICE VISIT (OUTPATIENT)
Dept: CARDIOLOGY | Facility: CLINIC | Age: 70
End: 2025-08-18
Payer: MEDICARE

## 2025-08-18 ENCOUNTER — PATIENT MESSAGE (OUTPATIENT)
Dept: CARDIOLOGY | Facility: HOSPITAL | Age: 70
End: 2025-08-18
Payer: MEDICARE

## 2025-08-18 VITALS
HEART RATE: 57 BPM | BODY MASS INDEX: 32.02 KG/M2 | OXYGEN SATURATION: 98 % | DIASTOLIC BLOOD PRESSURE: 74 MMHG | HEIGHT: 60 IN | WEIGHT: 163.13 LBS | SYSTOLIC BLOOD PRESSURE: 146 MMHG

## 2025-08-18 DIAGNOSIS — E78.49 OTHER HYPERLIPIDEMIA: ICD-10-CM

## 2025-08-18 DIAGNOSIS — I38 VALVULAR HEART DISEASE: ICD-10-CM

## 2025-08-18 DIAGNOSIS — G47.33 OSA (OBSTRUCTIVE SLEEP APNEA): ICD-10-CM

## 2025-08-18 DIAGNOSIS — R73.03 PREDIABETES: ICD-10-CM

## 2025-08-18 DIAGNOSIS — R09.89 CAROTID BRUIT, UNSPECIFIED LATERALITY: ICD-10-CM

## 2025-08-18 DIAGNOSIS — I10 PRIMARY HYPERTENSION: ICD-10-CM

## 2025-08-18 DIAGNOSIS — Z76.89 ENCOUNTER TO ESTABLISH CARE: ICD-10-CM

## 2025-08-18 DIAGNOSIS — R06.09 OTHER FORM OF DYSPNEA: ICD-10-CM

## 2025-08-18 DIAGNOSIS — I34.1 MITRAL VALVE PROLAPSE: ICD-10-CM

## 2025-08-18 DIAGNOSIS — Z78.9 STATIN INTOLERANCE: ICD-10-CM

## 2025-08-18 DIAGNOSIS — E66.811 OBESITY (BMI 30.0-34.9): ICD-10-CM

## 2025-08-18 DIAGNOSIS — Z82.49 FAMILY HISTORY OF CARDIAC DISORDER: ICD-10-CM

## 2025-08-18 DIAGNOSIS — I38 VALVULAR HEART DISEASE: Primary | ICD-10-CM

## 2025-08-18 DIAGNOSIS — R01.1 HEART MURMUR: ICD-10-CM

## 2025-08-18 LAB
OHS QRS DURATION: 76 MS
OHS QTC CALCULATION: 387 MS

## 2025-08-18 PROCEDURE — 99999 PR PBB SHADOW E&M-EST. PATIENT-LVL V: CPT | Mod: PBBFAC,,, | Performed by: INTERNAL MEDICINE

## 2025-08-18 PROCEDURE — 99205 OFFICE O/P NEW HI 60 MIN: CPT | Mod: S$PBB,,, | Performed by: INTERNAL MEDICINE

## 2025-08-18 PROCEDURE — 93005 ELECTROCARDIOGRAM TRACING: CPT | Mod: PO

## 2025-08-18 PROCEDURE — G2211 COMPLEX E/M VISIT ADD ON: HCPCS | Mod: ,,, | Performed by: INTERNAL MEDICINE

## 2025-08-18 PROCEDURE — 93010 ELECTROCARDIOGRAM REPORT: CPT | Mod: ,,, | Performed by: INTERNAL MEDICINE

## 2025-08-18 PROCEDURE — 99215 OFFICE O/P EST HI 40 MIN: CPT | Mod: PBBFAC,PO,25 | Performed by: INTERNAL MEDICINE

## 2025-08-19 ENCOUNTER — TELEPHONE (OUTPATIENT)
Dept: RESEARCH | Facility: HOSPITAL | Age: 70
End: 2025-08-19
Payer: MEDICARE

## 2025-08-25 PROBLEM — Z78.9 STATIN INTOLERANCE: Status: RESOLVED | Noted: 2025-08-18 | Resolved: 2025-08-25

## 2025-08-27 ENCOUNTER — TELEPHONE (OUTPATIENT)
Dept: CARDIOLOGY | Facility: HOSPITAL | Age: 70
End: 2025-08-27
Payer: MEDICARE

## 2025-08-27 ENCOUNTER — PATIENT MESSAGE (OUTPATIENT)
Dept: GENETICS | Facility: CLINIC | Age: 70
End: 2025-08-27

## 2025-08-27 ENCOUNTER — OFFICE VISIT (OUTPATIENT)
Dept: GENETICS | Facility: CLINIC | Age: 70
End: 2025-08-27
Payer: MEDICARE

## 2025-08-27 DIAGNOSIS — Z80.9 FAMILY HISTORY OF CANCER: ICD-10-CM

## 2025-08-27 DIAGNOSIS — Z71.83 ENCOUNTER FOR NONPROCREATIVE GENETIC COUNSELING: Primary | ICD-10-CM
